# Patient Record
Sex: FEMALE | Race: WHITE | Employment: FULL TIME | URBAN - METROPOLITAN AREA
[De-identification: names, ages, dates, MRNs, and addresses within clinical notes are randomized per-mention and may not be internally consistent; named-entity substitution may affect disease eponyms.]

---

## 2017-01-03 PROBLEM — J30.9 ALLERGIC RHINITIS: Status: ACTIVE | Noted: 2017-01-03

## 2017-02-01 ENCOUNTER — HOSPITAL ENCOUNTER (OUTPATIENT)
Dept: PHYSICAL THERAPY | Age: 42
Discharge: HOME | End: 2017-02-01
Attending: INTERNAL MEDICINE
Payer: COMMERCIAL

## 2017-02-01 DIAGNOSIS — M54.2 NECK PAIN: ICD-10-CM

## 2017-02-01 PROCEDURE — 97161 PT EVAL LOW COMPLEX 20 MIN: CPT

## 2017-02-01 PROCEDURE — 97110 THERAPEUTIC EXERCISES: CPT

## 2017-02-02 NOTE — PROGRESS NOTES
Ambulatory/Rehab Services H2 Model Falls Risk Assessment    Risk Factor Pts. ·   Confusion/Disorientation/Impulsivity  []    4 ·   Symptomatic Depression  []   2 ·   Altered Elimination  []   1 ·   Dizziness/Vertigo  []   1 ·   Gender (Male)  []   1 ·   Any administered antiepileptics (anticonvulsants):  []   2 ·   Any administered benzodiazepines:  []   1 ·   Visual Impairment (specify):  []   1 ·   Portable Oxygen Use  []   1 ·   Orthostatic ? BP  []   1 ·   History of Recent Falls (within 3 mos.)  []   5     Ability to Rise from Chair (choose one) Pts. ·   Ability to rise in a single movement  [x]   0 ·   Pushes up, successful in one attempt  []   1 ·   Multiple attempts, but successful  []   3 ·   Unable to rise without assistance  []   4   Total: (5 or greater = High Risk) 0     Falls Prevention Plan:   []                Physical Limitations to Exercise (specify):   []                Mobility Assistance Device (type):   []                Exercise/Equipment Adaptation (specify):    ©2010 [facility] [address] Patent #[id number].  Federal Law prohibits the replication, distribution or use without written permission from [facility] [employer]

## 2017-02-02 NOTE — PROGRESS NOTES
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 Fall Risk Score: 0 (? 5 = High Risk)  MD Orders: Evaluate and Treat MEDICAL/REFERRING DIAGNOSIS:  Neck pain [M54.2]   DATE OF ONSET: 2016  REFERRING PHYSICIAN: Adina Block MD  RETURN PHYSICIAN APPOINTMENT: TBD     INITIAL ASSESSMENT:  Ms. Loren Hill presents neck pain which began in 2016. Her chief complaint is pain and stiffness and describes a near constant sensation of choking around her lower cervical spine. She also presents with decreased cervical mobility, flexibility and strength which limits her ability to perform ADL and work related tasks without pain. She will benefit from skilled therapy to improve her strength and mobility to return to prior level of function. PROBLEM LIST (Impacting functional limitations):  1. Decreased Strength  2. Decreased ADL/Functional Activities  3. Increased Pain  4. Decreased Activity Tolerance  5. Decreased Flexibility/Joint Mobility  6. Decreased [place] with Home Exercise Program INTERVENTIONS PLANNED:  1. Heat  2. Home Exercise Program (HEP)  3. Manual Therapy  4. Neuromuscular Re-education/Strengthening  5. Range of Motion (ROM)  6. Therapeutic Exercise/Strengthening  7. Transcutaneous Electrical Nerve Stimulation (TENS)   TREATMENT PLAN:  Effective Dates: 17 TO 3/29/17. Frequency/Duration: 2 times a week for 8 weeks  GOALS: (Goals have been discussed and agreed upon with patient.)  Discharge Goals: Time Frame: 8  1.  Patient will be independent with home exercise program without assistance from therapist. 2. Patient will display pain free cervical rotation bilaterally to drive without pain/difficulty. 3. Patient will report pain rated at less than 2/10 at worst to return to full ADL/work performance. 4. Patient will report minimal severity of choking sensation around her neck to return to ADL tasks with less discomfort. 5. Patient will report NDI score of 0/50 to demonstrate improved cervical mobility and function. Rehabilitation Potential For Stated Goals: Good  Regarding Sridevi Fowler's therapy, I certify that the treatment plan above will be carried out by a therapist or under their direction. Thank you for this referral,  Nadia Henderson Siraniya     Referring Physician Signature: Julio C Dyson MD              Date                    The information in this section was collected on 17 (except where otherwise noted). HISTORY:   History of Present Injury/Illness (Reason for Referral):  Remigio Miller presents with neck pain which began in 2016 after noticing small mass at her anterior neck. She reports near constant choking sensation around her neck which also limits her neck mobility. She reports having had ultrasound and CT scans without insignificant findings and is scheduled to follow up with allergist and neurologist for further workup to help determine possible cause of her current symptoms. Along with pain and tightness she also reports brief instance of the back of her tongue going numb but sensation quickly normalized and has not returned in the past few weeks. Her goal is to treat the muscular components to her pain and return to normal daily tasks without pain. Past Medical History/Comorbidities:   Ms. Yina Smith  has a past medical history of Allergic rhinitis (1/3/2017) and H/O seasonal allergies. Ms. Yina Smith  has a past surgical history that includes  section. Social History/Living Environment:     Lives in private setting home, no barriers to progress.    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 , Disp: 90 Tab, Rfl: 3    cyclobenzaprine (FLEXERIL) 5 mg tablet, TAKE ONE TABLET BY MOUTH THREE TIMES A DAY as needed, Disp: 90 Tab, Rfl: 3    ibuprofen (MOTRIN) 200 mg tablet, Take  by mouth., Disp: , Rfl:     raNITIdine (ZANTAC) 150 mg tablet, Take 1 Tab by mouth two (2) times a day., Disp: 180 Tab, Rfl: 1    triamcinolone (NASACORT AQ) 55 mcg nasal inhaler, 2 Sprays by Both Nostrils route daily. , Disp: 1 Bottle, Rfl: 5    loratadine (CLARITIN) 10 mg tablet, Take 1 Tab by mouth daily. , Disp: 90 Tab, Rfl: 1    clonazePAM (KLONOPIN) 0.5 mg tablet, Take 1 Tab by mouth three (3) times daily as needed. Max Daily Amount: 1.5 mg., Disp: 20 Tab, Rfl: 0   Date Last Reviewed:  2/1/2017     Number of Personal Factors/Comorbidities that affect the Plan of Care: 0: LOW COMPLEXITY   EXAMINATION:   Observation/Orthostatic Postural Assessment:  Remigio Miller presents with moderate rounded shoulders and forward head posture. Significant kyphosis noted at cervicothoracic junction. Palpation:          Patient is tender at suboccipitals and all anterior neck musculature, especially bilateral SCM.    ROM:   Date: 2/1/17   Flexion FP   Extension DP   SBL DP   SBR DP   Rot L DP   Rot R  DP   Abbreviations: FP- functional painful, DP- dysfunctional non painful, FN- functional non painful, FP- functional painful   Strength:          5/5 gross upper extrermity strength   Neurological Screen:        Dermatomes:  Normal        Reflexes:  Normal        Neural Tension Tests:  Normal        Sensation: Normal           (SB=sidebending, Rot=rotation, TTP=tender to palpation, ULTTA=upper limb tension test-A, UQS=upper quarter scan, WNL=within normal limits; ROM measured in degrees)           Body Structures Involved:  1. Joints  2. Muscles  3. Ligaments Body Functions Affected:  1. Sensory/Pain  2. Neuromusculoskeletal  3. Movement Related Activities and Participation Affected:  1. General Tasks and Demands  2. Mobility  3. Self Care  4. Community, Social and [place]   Number of elements (examined above) that affect the Plan of Care: 4+: HIGH COMPLEXITY   CLINICAL PRESENTATION:   Presentation: Evolving clinical presentation with changing clinical characteristics: MODERATE COMPLEXITY   CLINICAL DECISION MAKING:   Outcome Measure: Tool Used: Neck Disability Index (NDI)  Score:  Initial: 6/50 (2/1/17)  Most Recent: X/50 (Date: -- )   Interpretation of Score: The Neck Disability Index is a revised form of the Oswestry Low Back Pain Index and is designed to measure the activities of daily living in person's with neck pain. Each section is scored on a 0-5 scale, 5 representing the greatest disability. The scores of each section are added together for a total score of 50. Medical Necessity:   · Patient is expected to demonstrate progress in strength and range of motion to increase independence with ADL and work tasks. Reason for Services/Other Comments:  · Patient continues to require present interventions due to patient's inability to rotate, look up or down without pain. Use of outcome tool(s) and clinical judgement create a POC that gives a: Clear prediction of patient's progress: LOW COMPLEXITY            TREATMENT:   (In addition to Assessment/Re-Assessment sessions the following treatments were rendered)  Pre-treatment Symptoms/Complaints:  Patient says she is frustrated in that she still is unsure what is causing her current symptoms. Pain: Initial:   Pain Intensity 1: 2/10 Post Session:  2/10     THERAPEUTIC EXERCISE: (15 minutes):  Exercises per grid below to improve mobility and strength. Required moderate verbal and manual cues to promote proper body posture and promote proper body mechanics. Progressed resistance, range, repetitions and complexity of movement as indicated. Date:  2/1/17 Date:   Date:     Activity/Exercise Parameters Parameters Parameters   Patient Education Plan of care, HEP     Diaphragmatic breathing 5 min     Cervical AROM All planes, pain free range x 5     Posture review 5 min, scapular retraction x 10                             Treatment/Session Assessment:    · Response to Treatment:  Patient tolerates well inclusion of initial cervical ROM and postural awareness tasks as part of HEP today. · Compliance with Program/Exercises: compliant most of the time. · Recommendations/Intent for next treatment session: \"Next visit will focus on advancements to more challenging activities\". Total Treatment Duration: 45 minutes evaluation, 15 minutes treatment   PT Patient Time In/Time Out  Time In: 1530  Time Out: [address].  Dereck

## 2017-02-07 ENCOUNTER — HOSPITAL ENCOUNTER (OUTPATIENT)
Dept: PHYSICAL THERAPY | Age: 42
Discharge: HOME | End: 2017-02-07
Attending: INTERNAL MEDICINE
Payer: COMMERCIAL

## 2017-02-07 PROCEDURE — 97140 MANUAL THERAPY 1/> REGIONS: CPT

## 2017-02-07 PROCEDURE — 97110 THERAPEUTIC EXERCISES: CPT

## 2017-02-07 NOTE — PROGRESS NOTES
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 Fall Risk Score: 0 (? 5 = High Risk)  MD Orders: Evaluate and Treat MEDICAL/REFERRING DIAGNOSIS:  Persons encountering health services in other specified circumstances [Z76.89]   DATE OF ONSET: 2016  REFERRING PHYSICIAN: Jenny Solitario MD  RETURN PHYSICIAN APPOINTMENT: TBD     INITIAL ASSESSMENT:  Ms. Leopold Greenhouse presents neck pain which began in 2016. Her chief complaint is pain and stiffness and describes a near constant sensation of choking around her lower cervical spine. She also presents with decreased cervical mobility, flexibility and strength which limits her ability to perform ADL and work related tasks without pain. She will benefit from skilled therapy to improve her strength and mobility to return to prior level of function. PROBLEM LIST (Impacting functional limitations):  1. Decreased Strength  2. Decreased ADL/Functional Activities  3. Increased Pain  4. Decreased Activity Tolerance  5. Decreased Flexibility/Joint Mobility  6. Decreased [place] with Home Exercise Program INTERVENTIONS PLANNED:  1. Heat  2. Home Exercise Program (HEP)  3. Manual Therapy  4. Neuromuscular Re-education/Strengthening  5. Range of Motion (ROM)  6. Therapeutic Exercise/Strengthening  7. Transcutaneous Electrical Nerve Stimulation (TENS)   TREATMENT PLAN:  Effective Dates: 17 TO 3/29/17. Frequency/Duration: 2 times a week for 8 weeks  GOALS: (Goals have been discussed and agreed upon with patient.)  Discharge Goals: Time Frame: 8  1.  Patient will be independent with home exercise program without assistance from therapist.   2. Patient will display pain free cervical rotation bilaterally to drive without pain/difficulty. 3. Patient will report pain rated at less than 2/10 at worst to return to full ADL/work performance. 4. Patient will report minimal severity of choking sensation around her neck to return to ADL tasks with less discomfort. 5. Patient will report NDI score of 0/50 to demonstrate improved cervical mobility and function. Rehabilitation Potential For Stated Goals: Good              The information in this section was collected on 17 (except where otherwise noted). HISTORY:   History of Present Injury/Illness (Reason for Referral):  Rakel Rdz presents with neck pain which began in 2016 after noticing small mass at her anterior neck. She reports near constant choking sensation around her neck which also limits her neck mobility. She reports having had ultrasound and CT scans without insignificant findings and is scheduled to follow up with allergist and neurologist for further workup to help determine possible cause of her current symptoms. Along with pain and tightness she also reports brief instance of the back of her tongue going numb but sensation quickly normalized and has not returned in the past few weeks. Her goal is to treat the muscular components to her pain and return to normal daily tasks without pain. Past Medical History/Comorbidities:   Ms. Constance Morales  has a past medical history of Allergic rhinitis (1/3/2017) and H/O seasonal allergies. Ms. Constance Morales  has a past surgical history that includes  section. Social History/Living Environment:     Lives in private setting home, no barriers to progress. 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 , Disp: 90 Tab, Rfl: 3    cyclobenzaprine (FLEXERIL) 5 mg tablet, TAKE ONE TABLET BY MOUTH THREE TIMES A DAY as needed, Disp: 90 Tab, Rfl: 3    ibuprofen (MOTRIN) 200 mg tablet, Take  by mouth., Disp: , Rfl:     raNITIdine (ZANTAC) 150 mg tablet, Take 1 Tab by mouth two (2) times a day., Disp: 180 Tab, Rfl: 1    triamcinolone (NASACORT AQ) 55 mcg nasal inhaler, 2 Sprays by Both Nostrils route daily. , Disp: 1 Bottle, Rfl: 5    loratadine (CLARITIN) 10 mg tablet, Take 1 Tab by mouth daily. , Disp: 90 Tab, Rfl: 1    clonazePAM (KLONOPIN) 0.5 mg tablet, Take 1 Tab by mouth three (3) times daily as needed. Max Daily Amount: 1.5 mg., Disp: 20 Tab, Rfl: 0   Date Last Reviewed:  2/7/2017     Number of Personal Factors/Comorbidities that affect the Plan of Care: 0: LOW COMPLEXITY   EXAMINATION:   Observation/Orthostatic Postural Assessment:  Loletta List presents with moderate rounded shoulders and forward head posture. Significant kyphosis noted at cervicothoracic junction. Palpation:          Patient is tender at suboccipitals and all anterior neck musculature, especially bilateral SCM. ROM:   Date: 2/1/17   Flexion FP   Extension DP   SBL DP   SBR DP   Rot L DP   Rot R  DP   Abbreviations: FP- functional painful, DP- dysfunctional non painful, FN- functional non painful, FP- functional painful   Strength:          5/5 gross upper extrermity strength   Neurological Screen:        Dermatomes:  Normal        Reflexes:  Normal        Neural Tension Tests:  Normal        Sensation: Normal           (SB=sidebending, Rot=rotation, TTP=tender to palpation, ULTTA=upper limb tension test-A, UQS=upper quarter scan, WNL=within normal limits; ROM measured in degrees)           Body Structures Involved:  1. Joints  2. Muscles  3. Ligaments Body Functions Affected:  1. Sensory/Pain  2. Neuromusculoskeletal  3. Movement Related Activities and Participation Affected:  1.  General Tasks and Demands  2. Mobility  3. Self Care  4. Community, Social and [place]   Number of elements (examined above) that affect the Plan of Care: 4+: HIGH COMPLEXITY   CLINICAL PRESENTATION:   Presentation: Evolving clinical presentation with changing clinical characteristics: MODERATE COMPLEXITY   CLINICAL DECISION MAKING:   Outcome Measure: Tool Used: Neck Disability Index (NDI)  Score:  Initial: 6/50 (2/1/17)  Most Recent: X/50 (Date: -- )   Interpretation of Score: The Neck Disability Index is a revised form of the Oswestry Low Back Pain Index and is designed to measure the activities of daily living in person's with neck pain. Each section is scored on a 0-5 scale, 5 representing the greatest disability. The scores of each section are added together for a total score of 50. Medical Necessity:   · Patient is expected to demonstrate progress in strength and range of motion to increase independence with ADL and work tasks. Reason for Services/Other Comments:  · Patient continues to require present interventions due to patient's inability to rotate, look up or down without pain. Use of outcome tool(s) and clinical judgement create a POC that gives a: Clear prediction of patient's progress: LOW COMPLEXITY            TREATMENT:   (In addition to Assessment/Re-Assessment sessions the following treatments were rendered)  Pre-treatment Symptoms/Complaints:  Patient reports her feelings of tightness remain unchanged the past few days. Pain: Initial:   Pain Intensity 1: 5/10 Post Session:  2/10     Therapeutic Exercise: (25 Minutes):  Exercises per grid below to improve mobility and strength. Required minimal verbal, manual and tactile cues to promote proper body posture and promote proper body mechanics. Progressed resistance, range, repetitions and complexity of movement as indicated.      Date:  2/1/17 Date:  2/7/17 Date:     Activity/Exercise Parameters Parameters Parameters   Patient Education Plan of care, HEP Update HEP    Diaphragmatic breathing 5 min 5 min    Cervical AROM All planes, pain free range x 5 x10 supine, x20 on ball    Posture review 5 min, scapular retraction x 10 5 min    Upper trap stretch -- 30s x 2    Scalene stretch -- 30s x 2            Manual Therapy (    Soft Tissue Mobilization Duration  Duration: 30 Minutes): Manual techniques to facilitate improved motion and decreased pain. - Suboccipital release, manual cervical traction  - Manual upper trapezius stretching    Treatment/Session Assessment:    · Response to Treatment:  Patient has more tightness at right scalene/upper trap musculature versus left but reports slightly decreased muscular tightness at end of today's session. Patient reports she is scheduled for follow up with ENT due to possibility of link of symptoms with recurrent reflux/hearburn symptoms. · Compliance with Program/Exercises: compliant most of the time. · Recommendations/Intent for next treatment session: \"Next visit will focus on advancements to more challenging activities\". Total Treatment Duration: 55 minutes  PT Patient Time In/Time Out  Time In: 1630  Time Out: [place].  Dereck

## 2017-02-08 PROBLEM — R13.12 OROPHARYNGEAL DYSPHAGIA: Status: ACTIVE | Noted: 2017-02-08

## 2017-02-09 ENCOUNTER — HOSPITAL ENCOUNTER (OUTPATIENT)
Dept: PHYSICAL THERAPY | Age: 42
Discharge: HOME | End: 2017-02-09
Attending: INTERNAL MEDICINE
Payer: COMMERCIAL

## 2017-02-09 PROCEDURE — 97140 MANUAL THERAPY 1/> REGIONS: CPT

## 2017-02-09 PROCEDURE — 97110 THERAPEUTIC EXERCISES: CPT

## 2017-02-09 NOTE — PROGRESS NOTES
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 Fall Risk Score: 0 (? 5 = High Risk)  MD Orders: Evaluate and Treat MEDICAL/REFERRING DIAGNOSIS:  Persons encountering health services in other specified circumstances [Z76.89]   DATE OF ONSET: 2016  REFERRING PHYSICIAN: Blaise Reyes MD  RETURN PHYSICIAN APPOINTMENT: TBD     INITIAL ASSESSMENT:  Ms. Jayna Conn presents neck pain which began in 2016. Her chief complaint is pain and stiffness and describes a near constant sensation of choking around her lower cervical spine. She also presents with decreased cervical mobility, flexibility and strength which limits her ability to perform ADL and work related tasks without pain. She will benefit from skilled therapy to improve her strength and mobility to return to prior level of function. PROBLEM LIST (Impacting functional limitations):  1. Decreased Strength  2. Decreased ADL/Functional Activities  3. Increased Pain  4. Decreased Activity Tolerance  5. Decreased Flexibility/Joint Mobility  6. Decreased P[place] with Home Exercise Program INTERVENTIONS PLANNED:  1. Heat  2. Home Exercise Program (HEP)  3. Manual Therapy  4. Neuromuscular Re-education/Strengthening  5. Range of Motion (ROM)  6. Therapeutic Exercise/Strengthening  7. Transcutaneous Electrical Nerve Stimulation (TENS)   TREATMENT PLAN:  Effective Dates: 17 TO 3/29/17. Frequency/Duration: 2 times a week for 8 weeks  GOALS: (Goals have been discussed and agreed upon with patient.)  Discharge Goals: Time Frame: 8  1.  Patient will be independent with home exercise program without assistance from therapist.   2. Patient will display pain free cervical rotation bilaterally to drive without pain/difficulty. 3. Patient will report pain rated at less than 2/10 at worst to return to full ADL/work performance. 4. Patient will report minimal severity of choking sensation around her neck to return to ADL tasks with less discomfort. 5. Patient will report NDI score of 0/50 to demonstrate improved cervical mobility and function. Rehabilitation Potential For Stated Goals: Good              The information in this section was collected on 17 (except where otherwise noted). HISTORY:   History of Present Injury/Illness (Reason for Referral):  Merlinda Locks presents with neck pain which began in 2016 after noticing small mass at her anterior neck. She reports near constant choking sensation around her neck which also limits her neck mobility. She reports having had ultrasound and CT scans without insignificant findings and is scheduled to follow up with allergist and neurologist for further workup to help determine possible cause of her current symptoms. Along with pain and tightness she also reports brief instance of the back of her tongue going numb but sensation quickly normalized and has not returned in the past few weeks. Her goal is to treat the muscular components to her pain and return to normal daily tasks without pain. Past Medical History/Comorbidities:   Ms. Gwen Mitchell  has a past medical history of Allergic rhinitis (1/3/2017) and H/O seasonal allergies. Ms. Gwen Mitchell  has a past surgical history that includes  section. Social History/Living Environment:     Lives in private setting home, no barriers to progress. 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    Dominant Side:         RIGHT  Current Medications:       Current Outpatient Prescriptions:     montelukast (SINGULAIR) 10 mg tablet, Take 1 Tab by mouth daily., Disp: 90 Tab, Rfl: 3    cyclobenzaprine (FLEXERIL) 5 mg tablet, TAKE ONE TABLET BY MOUTH THREE TIMES A DAY as needed, Disp: 90 Tab, Rfl: 3    ibuprofen (MOTRIN) 200 mg tablet, Take  by mouth., Disp: , Rfl:     raNITIdine (ZANTAC) 150 mg tablet, Take 1 Tab by mouth two (2) times a day., Disp: 180 Tab, Rfl: 1    triamcinolone (NASACORT AQ) 55 mcg nasal inhaler, 2 Sprays by Both Nostrils route daily. , Disp: 1 Bottle, Rfl: 5    loratadine (CLARITIN) 10 mg tablet, Take 1 Tab by mouth daily. , Disp: 90 Tab, Rfl: 1    clonazePAM (KLONOPIN) 0.5 mg tablet, Take 1 Tab by mouth three (3) times daily as needed. Max Daily Amount: 1.5 mg., Disp: 20 Tab, Rfl: 0   Date Last Reviewed:  2/9/2017     Number of Personal Factors/Comorbidities that affect the Plan of Care: 0: LOW COMPLEXITY   EXAMINATION:   Observation/Orthostatic Postural Assessment:  Dorinda Padilla presents with moderate rounded shoulders and forward head posture. Significant kyphosis noted at cervicothoracic junction. Palpation:          Patient is tender at suboccipitals and all anterior neck musculature, especially bilateral SCM. ROM:   Date: 2/1/17   Flexion FP   Extension DP   SBL DP   SBR DP   Rot L DP   Rot R  DP   Abbreviations: FP- functional painful, DP- dysfunctional non painful, FN- functional non painful, FP- functional painful   Strength:          5/5 gross upper extrermity strength   Neurological Screen:        Dermatomes:  Normal        Reflexes:  Normal        Neural Tension Tests:  Normal        Sensation: Normal           (SB=sidebending, Rot=rotation, TTP=tender to palpation, ULTTA=upper limb tension test-A, UQS=upper quarter scan, WNL=within normal limits; ROM measured in degrees)           Body Structures Involved:  1. Joints  2. Muscles  3. Ligaments Body Functions Affected:  1. Sensory/Pain  2. Neuromusculoskeletal  3. Movement Related Activities and Participation Affected:  1. General Tasks and Demands  2. Mobility  3.  Self Care  4. Community, Social and [place]   Number of elements (examined above) that affect the Plan of Care: 4+: HIGH COMPLEXITY   CLINICAL PRESENTATION:   Presentation: Evolving clinical presentation with changing clinical characteristics: MODERATE COMPLEXITY   CLINICAL DECISION MAKING:   Outcome Measure: Tool Used: Neck Disability Index (NDI)  Score:  Initial: 6/50 (2/1/17)  Most Recent: X/50 (Date: -- )   Interpretation of Score: The Neck Disability Index is a revised form of the Oswestry Low Back Pain Index and is designed to measure the activities of daily living in person's with neck pain. Each section is scored on a 0-5 scale, 5 representing the greatest disability. The scores of each section are added together for a total score of 50. Medical Necessity:   · Patient is expected to demonstrate progress in strength and range of motion to increase independence with ADL and work tasks. Reason for Services/Other Comments:  · Patient continues to require present interventions due to patient's inability to rotate, look up or down without pain. Use of outcome tool(s) and clinical judgement create a POC that gives a: Clear prediction of patient's progress: LOW COMPLEXITY            TREATMENT:   (In addition to Assessment/Re-Assessment sessions the following treatments were rendered)  Pre-treatment Symptoms/Complaints:  Patient says she saw neurologist yesterday and physician determined there was no evidence of neurological involvement to her symptoms. She states her discomfort has been better the past few days. Pain: Initial:   Pain Intensity 1: 3/10 Post Session:  2/10     Therapeutic Exercise: (15 Minutes):  Exercises per grid below to improve mobility and strength. Required minimal verbal, manual and tactile cues to promote proper body posture and promote proper body mechanics. Progressed resistance, range, repetitions and complexity of movement as indicated.      Date:  2/1/17 Date:  2/7/17 Date:  2/9/17   Activity/Exercise Parameters Parameters Parameters   Patient Education Plan of care, HEP Update HEP Review HEP   Diaphragmatic breathing 5 min 5 min 5 min   Cervical AROM All planes, pain free range x 5 x10 supine, x20 on ball x20 on ball   Posture review 5 min, scapular retraction x 10 5 min --   Upper trap stretch -- 30s x 2 30s x 2   Scalene stretch -- 30s x 2 30s x 2   PNF with cervical rotation -- -- x5 each side     Manual Therapy (    Soft Tissue Mobilization Duration  Duration: 30 Minutes): Manual techniques to facilitate improved motion and decreased pain. - Suboccipital release, manual cervical traction  - Manual upper trapezius stretching    Treatment/Session Assessment:    · Response to Treatment:  Patient much less irritable today and displays increased pain free cervical mobility today. She will follow up with ENT on 2/21/17 and then return to therapy as needed after visit. · Compliance with Program/Exercises: compliant most of the time. · Recommendations/Intent for next treatment session: \"Next visit will focus on advancements to more challenging activities\". Total Treatment Duration: 45 minutes  PT Patient Time In/Time Out  Time In: 1630  Time Out: Sadi 58.  Dereck

## 2017-02-15 ENCOUNTER — APPOINTMENT (OUTPATIENT)
Dept: PHYSICAL THERAPY | Age: 42
End: 2017-02-15
Attending: INTERNAL MEDICINE
Payer: COMMERCIAL

## 2017-03-08 ENCOUNTER — HOSPITAL ENCOUNTER (OUTPATIENT)
Dept: PHYSICAL THERAPY | Age: 42
Discharge: HOME | End: 2017-03-08
Attending: INTERNAL MEDICINE

## 2017-03-08 NOTE — PROGRESS NOTES
[facility] at [address] [place] [address] [facility]  [phone]   [phone]    DATE: 3/8/2017    Patient cancelled appointment today and will place therapy on hold at this time to follow up with swallowing study later this month.        Karlene Beverly, PT, DPT

## 2017-03-14 ENCOUNTER — HOSPITAL ENCOUNTER (OUTPATIENT)
Dept: PHYSICAL THERAPY | Age: 42
End: 2017-03-14
Attending: INTERNAL MEDICINE

## 2017-03-16 ENCOUNTER — APPOINTMENT (OUTPATIENT)
Dept: PHYSICAL THERAPY | Age: 42
End: 2017-03-16
Attending: INTERNAL MEDICINE

## 2017-03-17 ENCOUNTER — HOSPITAL ENCOUNTER (OUTPATIENT)
Dept: MRI IMAGING | Age: 42
Discharge: HOME | End: 2017-03-17
Attending: INTERNAL MEDICINE
Payer: COMMERCIAL

## 2017-03-17 DIAGNOSIS — R51.9 NONINTRACTABLE HEADACHE, UNSPECIFIED CHRONICITY PATTERN, UNSPECIFIED HEADACHE TYPE: ICD-10-CM

## 2017-03-17 DIAGNOSIS — M54.2 NECK PAIN: ICD-10-CM

## 2017-03-17 PROCEDURE — 70551 MRI BRAIN STEM W/O DYE: CPT

## 2017-03-17 PROCEDURE — 72141 MRI NECK SPINE W/O DYE: CPT

## 2017-04-05 ENCOUNTER — APPOINTMENT (OUTPATIENT)
Dept: PHYSICAL THERAPY | Age: 42
End: 2017-04-05
Attending: INTERNAL MEDICINE

## 2017-04-25 ENCOUNTER — HOSPITAL ENCOUNTER (OUTPATIENT)
Age: 42
Setting detail: OUTPATIENT SURGERY
Discharge: HOME | End: 2017-04-25
Attending: ANESTHESIOLOGY | Admitting: ANESTHESIOLOGY
Payer: COMMERCIAL

## 2017-04-25 VITALS
SYSTOLIC BLOOD PRESSURE: 125 MMHG | BODY MASS INDEX: 37.5 KG/M2 | TEMPERATURE: 98.5 F | DIASTOLIC BLOOD PRESSURE: 82 MMHG | WEIGHT: 192 LBS | HEART RATE: 90 BPM | OXYGEN SATURATION: 96 % | RESPIRATION RATE: 12 BRPM

## 2017-04-25 LAB — HCG UR QL: NEGATIVE

## 2017-04-25 PROCEDURE — 76210000020 HC REC RM PH II FIRST 0.5 HR: Performed by: ANESTHESIOLOGY

## 2017-04-25 PROCEDURE — 77030014125 HC TY EPDRL BBMI -B: Performed by: ANESTHESIOLOGY

## 2017-04-25 PROCEDURE — 76210000063 HC OR PH I REC FIRST 0.5 HR: Performed by: ANESTHESIOLOGY

## 2017-04-25 PROCEDURE — 74011250636 HC RX REV CODE- 250/636: Performed by: ANESTHESIOLOGY

## 2017-04-25 PROCEDURE — 74011250636 HC RX REV CODE- 250/636

## 2017-04-25 PROCEDURE — 76010000230: Performed by: ANESTHESIOLOGY

## 2017-04-25 PROCEDURE — 81025 URINE PREGNANCY TEST: CPT

## 2017-04-25 RX ORDER — FENTANYL CITRATE 50 UG/ML
INJECTION, SOLUTION INTRAMUSCULAR; INTRAVENOUS AS NEEDED
Status: DISCONTINUED | OUTPATIENT
Start: 2017-04-25 | End: 2017-04-25 | Stop reason: HOSPADM

## 2017-04-25 RX ORDER — MIDAZOLAM HYDROCHLORIDE 1 MG/ML
INJECTION, SOLUTION INTRAMUSCULAR; INTRAVENOUS AS NEEDED
Status: DISCONTINUED | OUTPATIENT
Start: 2017-04-25 | End: 2017-04-25 | Stop reason: HOSPADM

## 2017-04-25 RX ORDER — TRIAMCINOLONE ACETONIDE 40 MG/ML
INJECTION, SUSPENSION INTRA-ARTICULAR; INTRAMUSCULAR AS NEEDED
Status: DISCONTINUED | OUTPATIENT
Start: 2017-04-25 | End: 2017-04-25 | Stop reason: HOSPADM

## 2017-04-25 NOTE — IP AVS SNAPSHOT
[address] [facility] 
[phone] Patient: Andre Hudson MRN: [id number] :1975 You are allergic to the following No active allergies Recent Documentation Weight BMI OB Status Smoking Status 87.1 kg 37.5 kg/m2 Having regular periods Never Smoker Emergency Contacts Name Discharge Info Relation Home Work Mobile Trevon Hill DISCHARGE CAREGIVER [3] Spouse [3] [phone] About your hospitalization You were admitted on:  2017 You last received care in the:  [facility] OP PACU You were discharged on:  2017 Unit phone number:  [phone] Why you were hospitalized Your primary diagnosis was:  Not on File Providers Seen During Your Hospitalizations Provider Role Specialty Primary office phone Tammy Ruiz MD Attending Provider Anesthesiology [phone] Your Primary Care Physician (PCP) Primary Care Physician Office Phone Office Fax Nimco Weinberg [phone] Follow-up Information None Current Discharge Medication List  
  
ASK your doctor about these medications Dose & Instructions Dispensing Information Comments Morning Noon Evening Bedtime  
 clonazePAM 0.5 mg tablet Commonly known as:  Elaine Medellin Your last dose was: Your next dose is:    
   
   
 Dose:  0.5 mg Take 1 Tab by mouth three (3) times daily as needed. Max Daily Amount: 1.5 mg.  
 Quantity:  20 Tab Refills:  0  
     
   
   
   
  
 cyclobenzaprine 5 mg tablet Commonly known as:  FLEXERIL Your last dose was: Your next dose is: TAKE ONE TABLET BY MOUTH THREE TIMES A DAY as needed Quantity:  90 Tab Refills:  3  
     
   
   
   
  
 loratadine 10 mg tablet Commonly known as:  Lendon Chantal Your last dose was: Your next dose is:    
   
   
 Dose:  10 mg Take 1 Tab by mouth daily. Quantity:  90 Tab Refills:  1  
     
   
   
   
  
 montelukast 10 mg tablet Commonly known as:  SINGULAIR Your last dose was: Your next dose is:    
   
   
 Dose:  10 mg Take 1 Tab by mouth daily. Quantity:  90 Tab Refills:  3 Omeprazole delayed release 20 mg tablet Commonly known as:  PRILOSEC D/R Your last dose was: Your next dose is:    
   
   
 Dose:  20 mg Take 20 mg by mouth daily. Refills:  0  
     
   
   
   
  
 triamcinolone 55 mcg nasal inhaler Commonly known as:  NASACORT AQ Your last dose was: Your next dose is:    
   
   
 Dose:  2 Spray 2 Sprays by Both Nostrils route daily. Quantity:  1 Bottle Refills:  5 Discharge Instructions Pain Management Aftercare Common Side Effects that may last 8-12 hours: 
Drowsiness  Slurred speech  Dizziness Poor balance  Blurred vision Hangover effect (headache, upset stomach, etc.) Aftercare Instructions: You must have a responsible adult drive you home. Do not drive a car or operate equipment for at least 12 hours. Do not take any new medications for at least 24 hours unless your doctor has prescribed them and he is aware that you are taking them. Do not drink any alcoholic beverages until the next day. Advance to your normal diet as tolerated. Expect soreness at the injection site that will improve over the next 24 hours. Avoid strenuous exercise or heavy lifting. Pre-injection activities may be resumed tomorrow (unless instructed otherwise by your doctor). Notify your doctor immediately if any of the following symptoms occur: 
Severe pain at the injection site Bleeding or drainage from injection site Fever 101 degrees F or greater New or increased weakness/numbness of extremities that does not resolve Severe headache that disappears or gets better when you lie down Breathing difficulty Skin rash Vomiting Seizures Unusual drowsiness Doctor's Phone Number: [phone] Follow-up Care: Follow up appointment: 5/19/17 at 10:00AM 
 
 
DISCHARGE SUMMARY from Nurse PATIENT INSTRUCTIONS: 
 
After general anesthesia or intravenous sedation, for 24 hours or while taking prescription Narcotics: · Limit your activities · Do not drive and operate hazardous machinery · Do not make important personal or business decisions · Do  not drink alcoholic beverages · If you have not urinated within 8 hours after discharge, please contact your surgeon on call. *  Please give a list of your current medications to your Primary Care Provider. *  Please update this list whenever your medications are discontinued, doses are 
    changed, or new medications (including over-the-counter products) are added. *  Please carry medication information at all times in case of emergency situations. These are general instructions for a healthy lifestyle: No smoking/ No tobacco products/ Avoid exposure to second hand smoke Surgeon General's Warning:  Quitting smoking now greatly reduces serious risk to your health. Obesity, smoking, and sedentary lifestyle greatly increases your risk for illness A healthy diet, regular physical exercise & weight monitoring are important for maintaining a healthy lifestyle You may be retaining fluid if you have a history of heart failure or if you experience any of the following symptoms:  Weight gain of 3 pounds or more overnight or 5 pounds in a week, increased swelling in our hands or feet or shortness of breath while lying flat in bed. Please call your doctor as soon as you notice any of these symptoms; do not wait until your next office visit. Recognize signs and symptoms of STROKE: 
 
F-face looks uneven A-arms unable to move or move unevenly S-speech slurred or non-existent T-time-call 911 as soon as signs and symptoms begin-DO NOT go Back to bed or wait to see if you get better-TIME IS BRAIN. Discharge Orders None Introducing [facility] HE[facility]! Dear Cam Martinez: 
Thank you for requesting a [employer] account. Our records indicate that you already have an active [employer] account. You can access your account anytime at [email or web address] Did you know that you can access your hospital and ER discharge instructions at any time in [employer]? You can also review all of your test results from your hospital stay or ER visit. Additional Information If you have questions, please visit the Frequently Asked Questions section of the [employer] website at [email or web address] Remember, [employer] is NOT to be used for urgent needs. For medical emergencies, dial 911. Now available from your iPhone and Android! General Information Please provide this summary of care documentation to your next provider. Patient Signature:  ____________________________________________________________ Date:  ____________________________________________________________  
  
Prabha Cons Provider Signature:  ____________________________________________________________ Date:  ____________________________________________________________

## 2017-04-25 NOTE — H&P
Date of Surgery Update:  Jayne Morse was seen and examined. History and physical has been reviewed. The patient has been examined.  There have been no significant clinical changes since the completion of the originally dated History and Physical.    Signed By: Shari Brewster MD     April 25, 2017 7:34 AM

## 2017-04-25 NOTE — DISCHARGE INSTRUCTIONS
Pain Management Aftercare    Common Side Effects that may last 8-12 hours:  Drowsiness  Slurred speech  Dizziness  Poor balance  Blurred vision     Hangover effect (headache, upset stomach, etc.)    Aftercare Instructions: You must have a responsible adult drive you home. Do not drive a car or operate equipment for at least 12 hours. Do not take any new medications for at least 24 hours unless your doctor has prescribed them and he is aware that you are taking them. Do not drink any alcoholic beverages until the next day. Advance to your normal diet as tolerated. Expect soreness at the injection site that will improve over the next 24 hours. Avoid strenuous exercise or heavy lifting. Pre-injection activities may be resumed tomorrow (unless instructed otherwise by your doctor). Notify your doctor immediately if any of the following symptoms occur:  Severe pain at the injection site  Bleeding or drainage from injection site  Fever 101 degrees F or greater  New or increased weakness/numbness of extremities that does not resolve  Severe headache that disappears or gets better when you lie down  Breathing difficulty  Skin rash  Vomiting  Seizures  Unusual drowsiness    Doctor's Phone Number: [phone]    Follow-up Care: Follow up appointment: 5/19/17 at 10:00AM      DISCHARGE SUMMARY from Nurse    PATIENT INSTRUCTIONS:    After general anesthesia or intravenous sedation, for 24 hours or while taking prescription Narcotics:  · Limit your activities  · Do not drive and operate hazardous machinery  · Do not make important personal or business decisions  · Do  not drink alcoholic beverages  · If you have not urinated within 8 hours after discharge, please contact your surgeon on call. *  Please give a list of your current medications to your Primary Care Provider.     *  Please update this list whenever your medications are discontinued, doses are      changed, or new medications (including over-the-counter products) are added. *  Please carry medication information at all times in case of emergency situations. These are general instructions for a healthy lifestyle:    No smoking/ No tobacco products/ Avoid exposure to second hand smoke    Surgeon General's Warning:  Quitting smoking now greatly reduces serious risk to your health. Obesity, smoking, and sedentary lifestyle greatly increases your risk for illness    A healthy diet, regular physical exercise & weight monitoring are important for maintaining a healthy lifestyle    You may be retaining fluid if you have a history of heart failure or if you experience any of the following symptoms:  Weight gain of 3 pounds or more overnight or 5 pounds in a week, increased swelling in our hands or feet or shortness of breath while lying flat in bed. Please call your doctor as soon as you notice any of these symptoms; do not wait until your next office visit. Recognize signs and symptoms of STROKE:    F-face looks uneven    A-arms unable to move or move unevenly    S-speech slurred or non-existent    T-time-call 911 as soon as signs and symptoms begin-DO NOT go       Back to bed or wait to see if you get better-TIME IS BRAIN.

## 2017-04-25 NOTE — BRIEF OP NOTE
BRIEF OPERATIVE NOTE    Date of Procedure: 4/25/2017   Preoperative Diagnosis: Thoracic spinal stenosis [M48.04]  Postoperative Diagnosis: THORACIC SPINAL STENOSIS    Procedure(s):  CERVICAL EPIDURAL STEROID INJECTION#1  Surgeon(s) and Role:     * Nader Kathleen MD - Primary         Assistant Staff:       Surgical Staff:  Circ-1: Jed Herzog RN  Local Nurse Monitor: Gabo Armstrong RN  Scrub Tech-1: Lieutenant Isaiah Mendoza  Event Time In   Incision Start 6487   Incision Close 0148     Anesthesia: Local   Estimated Blood Loss: none  Specimens: * No specimens in log *   Findings: none   Complications: none  Implants: * No implants in log *

## 2017-04-25 NOTE — OP NOTES
[facility]   OPERATIVE REPORT       Name:  Yair Burnham   MR#:  [id number]   :  1975   Account #:  [de-identified]   Date of Adm:  2017       DATE OF SERVICE: 2017     PREOPERATIVE DIAGNOSES   1. Neck pain secondary to cervical spinal stenosis at C5-C6,   combined with cervical disk bulging and degenerative disk   disease. 2. Cervicalgia. 3. Chronic tension headaches and possible cervicogenic   headaches. POSTOPERATIVE DIAGNOSES   1. Neck pain secondary to cervical spinal stenosis at C5-C6,   combined with cervical disk bulging and degenerative disk   disease. 2. Cervicalgia. 3. Chronic tension headaches and possible cervicogenic   headaches. PROCEDURE: Cervical epidural steroid injection #1. SURGEON: Emily Durand MD    ANESTHESIA: Local with IV sedation. 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 She presents today for a   cervical epidural steroid injection at [facility]. DESCRIPTION OF PROCEDURE: After informed consent was obtained, a   22-gauge IV was placed in the right arm, and the patient was   taken to the operating room and positioned in a chair sitting   upright. Monitors were applied and vital signs were stable. The   neck was prepped and draped in the usual sterile fashion and a   small skin wheal was made over the C5-C6 interspace using 1%   lidocaine. Next, an 18-gauge Tuohy needle was advanced using the loss of   resistance technique. Upon loss of resistance, no blood or   cerebrospinal fluid was noted. After negative aspiration, 6 mL   of solution consisting of 4 mL of preservative-free normal   saline and 80 mg of triamcinolone was injected. She tolerated   the procedure well. There were no complications. She was   transferred to the recovery room in satisfactory condition. BLOOD LOSS: None. FLUIDS: None.     COMPLICATIONS: None.    CONDITION: Stable. PLANS   1. I recommended continuing Mobic 15 mg per day. 2. We will hopefully get her the cervical traction collar and I   would recommend that she use it on a regular basis. 3. I will see her back in 4 weeks for followup in my office.         MD BERKLEY Diaz / DARLEEN   D:  04/25/2017   08:50   T:  04/25/2017   09:57   Job #:  [id number]

## 2017-05-08 NOTE — PROGRESS NOTES
[place]  : [facility] [address]  [phone]   [phone]          OUTPATIENT PHYSICAL THERAPY:Discontinuation Summary 2017    ICD-10: Treatment Diagnosis: Cervicalgia (M54.2), Postural kyphosis, cervicothoracic region (M40.03)  Precautions/Allergies:   Review of patient's allergies indicates no known allergies. Fall Risk Score: 0 (? 5 = High Risk)  MD Orders: Evaluate and Treat MEDICAL/REFERRING DIAGNOSIS:  Persons encountering health services in other specified circumstances [Z76.89]   DATE OF ONSET: 2016  REFERRING PHYSICIAN: Wade Farrell MD  RETURN PHYSICIAN APPOINTMENT: TBD     INITIAL ASSESSMENT:  Ms. Mikey Nassar presents neck pain which began in 2016. Her chief complaint is pain and stiffness and describes a near constant sensation of choking around her lower cervical spine. She also presents with decreased cervical mobility, flexibility and strength which limits her ability to perform ADL and work related tasks without pain. She will benefit from skilled therapy to improve her strength and mobility to return to prior level of function. 17: Patient self discharging from therapy after having injection for continued neck pain. Patient to continue with HEP at this time and follow up as needed in future. PROBLEM LIST (Impacting functional limitations):  1. Decreased Strength  2. Decreased ADL/Functional Activities  3. Increased Pain  4. Decreased Activity Tolerance  5. Decreased Flexibility/Joint Mobility  6. Decreased Lafe with Home Exercise Program INTERVENTIONS PLANNED:  1. Heat  2. Home Exercise Program (HEP)  3. Manual Therapy  4. Neuromuscular Re-education/Strengthening  5. Range of Motion (ROM)  6. Therapeutic Exercise/Strengthening  7.  Transcutaneous Electrical Nerve Stimulation (TENS)   TREATMENT PLAN:    GOALS: (Goals have been discussed and agreed upon with patient.)  Discharge Goals: Time Frame: 8  1. Patient will be independent with home exercise program without assistance from therapist. MET  2. Patient will display pain free cervical rotation bilaterally to drive without pain/difficulty. MADE PROGRESS, NOT MET  3. Patient will report pain rated at less than 2/10 at worst to return to full ADL/work performance. MADE PROGRESS, NOT MET  4. Patient will report minimal severity of choking sensation around her neck to return to ADL tasks with less discomfort. MADE PROGRESS, NOT MET  5. Patient will report NDI score of 0/50 to demonstrate improved cervical mobility and function. MADE PROGRESS, NOT MET  Rehabilitation Potential For Stated Goals: Good              The information in this section was collected on 2/1/17 (except where otherwise noted). HISTORY:   History of Present Injury/Illness (Reason for Referral):  [place] presents with neck pain which began in August 2016 after noticing small mass at her anterior neck. She reports near constant choking sensation around her neck which also limits her neck mobility. She reports having had ultrasound and CT scans without insignificant findings and is scheduled to follow up with allergist and neurologist for further workup to help determine possible cause of her current symptoms. Along with pain and tightness she also reports brief instance of the back of her tongue going numb but sensation quickly normalized and has not returned in the past few weeks. Her goal is to treat the muscular components to her pain and return to normal daily tasks without pain. Past Medical History/Comorbidities:   Ms. Luiz Motley  has a past medical history of Allergic rhinitis (1/3/2017); Anxiety; Chronic pain; DDD (degenerative disc disease), cervical; GERD (gastroesophageal reflux disease); H/O seasonal allergies; and Morbid obesity ([facility] [address] She also has no past medical history of Adverse effect of anesthesia;  Difficult intubation; Malignant hyperthermia due to anesthesia; Nausea & vomiting; or Pseudocholinesterase deficiency. Ms. Allie Kapadia  has a past surgical history that includes  section. Social History/Living Environment:     Lives in private setting home, no barriers to progress. 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 Dominant Side:         RIGHT  Current Medications:       Current Outpatient Prescriptions:     Omeprazole delayed release (PRILOSEC D/R) 20 mg tablet, Take 20 mg by mouth daily. , Disp: , Rfl:     montelukast (SINGULAIR) 10 mg tablet, Take 1 Tab by mouth daily. (Patient taking differently: Take 10 mg by mouth nightly. Indications: ALLERGIC RHINITIS), Disp: 90 Tab, Rfl: 3    cyclobenzaprine (FLEXERIL) 5 mg tablet, TAKE ONE TABLET BY MOUTH THREE TIMES A DAY as needed, Disp: 90 Tab, Rfl: 3    triamcinolone (NASACORT AQ) 55 mcg nasal inhaler, 2 Sprays by Both Nostrils route daily. , Disp: 1 Bottle, Rfl: 5    loratadine (CLARITIN) 10 mg tablet, Take 1 Tab by mouth daily. , Disp: 90 Tab, Rfl: 1    clonazePAM (KLONOPIN) 0.5 mg tablet, Take 1 Tab by mouth three (3) times daily as needed. Max Daily Amount: 1.5 mg. (Patient taking differently: Take 0.5 mg by mouth three (3) times daily as needed for Pain (anxiety). ), Disp: 20 Tab, Rfl: 0   Date Last Reviewed:  2017     Number of Personal Factors/Comorbidities that affect the Plan of Care: 0: LOW COMPLEXITY   EXAMINATION:   Observation/Orthostatic Postural Assessment:  Valma Stage presents with moderate rounded shoulders and forward head posture. Significant kyphosis noted at cervicothoracic junction. Palpation:          Patient is tender at suboccipitals and all anterior neck musculature, especially bilateral SCM.    ROM:   Date: 17   Flexion FP   Extension DP   SBL DP   SBR DP   Rot L DP   Rot R  DP   Abbreviations: FP- functional painful, DP- dysfunctional non painful, FN- functional non painful, FP- functional painful   Strength:          5/5 gross upper extrermity strength   Neurological Screen:        Dermatomes:  Normal        Reflexes:  Normal        Neural Tension Tests:  Normal        Sensation: Normal           (SB=sidebending, Rot=rotation, TTP=tender to palpation, ULTTA=upper limb tension test-A, UQS=upper quarter scan, WNL=within normal limits; ROM measured in degrees)           Body Structures Involved:  1. Joints  2. Muscles  3. Ligaments Body Functions Affected:  1. Sensory/Pain  2. Neuromusculoskeletal  3. Movement Related Activities and Participation Affected:  1. General Tasks and Demands  2. Mobility  3. Self Care  4. Community, Social and [place]   Number of elements (examined above) that affect the Plan of Care: 4+: HIGH COMPLEXITY   CLINICAL PRESENTATION:   Presentation: Evolving clinical presentation with changing clinical characteristics: MODERATE COMPLEXITY   CLINICAL DECISION MAKING:   Outcome Measure: Tool Used: Neck Disability Index (NDI)  Score:  Initial: 6/50 (2/1/17)  Most Recent: X/50 (Date: -- )   Interpretation of Score: The Neck Disability Index is a revised form of the Oswestry Low Back Pain Index and is designed to measure the activities of daily living in person's with neck pain. Each section is scored on a 0-5 scale, 5 representing the greatest disability. The scores of each section are added together for a total score of 50. Medical Necessity:   · Patient is expected to demonstrate progress in strength and range of motion to increase independence with ADL and work tasks. Reason for Services/Other Comments:  · Patient continues to require present interventions due to patient's inability to rotate, look up or down without pain. Use of outcome tool(s) and clinical judgement create a POC that gives a: Clear prediction of patient's progress: LOW COMPLEXITY                   Recommendations: Discharge from physical therapy. Carolina Mtz.  Dereck

## 2017-08-04 ENCOUNTER — HOSPITAL ENCOUNTER (OUTPATIENT)
Dept: GENERAL RADIOLOGY | Age: 42
Discharge: HOME | End: 2017-08-04
Attending: INTERNAL MEDICINE
Payer: COMMERCIAL

## 2017-08-04 VITALS — HEIGHT: 60 IN | WEIGHT: 190 LBS | BODY MASS INDEX: 37.3 KG/M2

## 2017-08-04 DIAGNOSIS — R53.83 FATIGUE, UNSPECIFIED TYPE: ICD-10-CM

## 2017-08-04 DIAGNOSIS — R13.12 OROPHARYNGEAL DYSPHAGIA: ICD-10-CM

## 2017-08-04 DIAGNOSIS — R79.82 CRP ELEVATED: ICD-10-CM

## 2017-08-04 DIAGNOSIS — M54.2 NECK PAIN: ICD-10-CM

## 2017-08-04 PROCEDURE — 74011000250 HC RX REV CODE- 250: Performed by: INTERNAL MEDICINE

## 2017-08-04 PROCEDURE — 74220 X-RAY XM ESOPHAGUS 1CNTRST: CPT

## 2017-08-04 PROCEDURE — 74011000255 HC RX REV CODE- 255: Performed by: INTERNAL MEDICINE

## 2017-08-04 RX ADMIN — BARIUM SULFATE 700 MG: 700 TABLET ORAL at 13:25

## 2017-08-04 RX ADMIN — BARIUM SULFATE 355 ML: 0.6 SUSPENSION ORAL at 13:26

## 2017-08-04 RX ADMIN — BARIUM SULFATE 135 ML: 980 POWDER, FOR SUSPENSION ORAL at 13:25

## 2017-08-04 RX ADMIN — ANTACID/ANTIFLATULENT 4 G: 380; 550; 10; 10 GRANULE, EFFERVESCENT ORAL at 13:24

## 2017-08-07 NOTE — PROGRESS NOTES
See telephone visit for details about these test results. Dysmotility noted. GI referral recommended. Will fax results to rheumatology as well for evaluation.

## 2017-08-15 ENCOUNTER — APPOINTMENT (OUTPATIENT)
Dept: CT IMAGING | Age: 42
End: 2017-08-15
Attending: EMERGENCY MEDICINE
Payer: COMMERCIAL

## 2017-08-15 ENCOUNTER — HOSPITAL ENCOUNTER (EMERGENCY)
Age: 42
Discharge: HOME | End: 2017-08-15
Attending: EMERGENCY MEDICINE
Payer: COMMERCIAL

## 2017-08-15 VITALS
OXYGEN SATURATION: 99 % | BODY MASS INDEX: 37.3 KG/M2 | RESPIRATION RATE: 16 BRPM | DIASTOLIC BLOOD PRESSURE: 87 MMHG | TEMPERATURE: 98.5 F | HEART RATE: 86 BPM | HEIGHT: 60 IN | WEIGHT: 190 LBS | SYSTOLIC BLOOD PRESSURE: 149 MMHG

## 2017-08-15 DIAGNOSIS — R51.9 OCCIPITAL HEADACHE: Primary | ICD-10-CM

## 2017-08-15 LAB
ALBUMIN SERPL BCP-MCNC: 4.1 G/DL (ref 3.5–5)
ALBUMIN/GLOB SERPL: 1.1 {RATIO} (ref 1.2–3.5)
ALP SERPL-CCNC: 49 U/L (ref 50–136)
ALT SERPL-CCNC: 50 U/L (ref 12–65)
ANION GAP BLD CALC-SCNC: 11 MMOL/L (ref 7–16)
AST SERPL W P-5'-P-CCNC: 28 U/L (ref 15–37)
BASOPHILS # BLD: 0 K/UL (ref 0–0.2)
BASOPHILS NFR BLD: 0 % (ref 0–2)
BILIRUB SERPL-MCNC: 0.3 MG/DL (ref 0.2–1.1)
BUN SERPL-MCNC: 13 MG/DL (ref 6–23)
CALCIUM SERPL-MCNC: 9.1 MG/DL (ref 8.3–10.4)
CHLORIDE SERPL-SCNC: 103 MMOL/L (ref 98–107)
CO2 SERPL-SCNC: 28 MMOL/L (ref 21–32)
CREAT SERPL-MCNC: 0.77 MG/DL (ref 0.6–1)
DIFFERENTIAL METHOD BLD: ABNORMAL
EOSINOPHIL # BLD: 0 K/UL (ref 0–0.8)
EOSINOPHIL NFR BLD: 0 % (ref 0.5–7.8)
ERYTHROCYTE [DISTWIDTH] IN BLOOD BY AUTOMATED COUNT: 12.4 % (ref 11.9–14.6)
ERYTHROCYTE [SEDIMENTATION RATE] IN BLOOD: 16 MM/HR (ref 0–20)
GLOBULIN SER CALC-MCNC: 3.8 G/DL (ref 2.3–3.5)
GLUCOSE SERPL-MCNC: 94 MG/DL (ref 65–100)
HCT VFR BLD AUTO: 40.1 % (ref 35.8–46.3)
HGB BLD-MCNC: 14.1 G/DL (ref 11.7–15.4)
IMM GRANULOCYTES # BLD: 0 K/UL (ref 0–0.5)
IMM GRANULOCYTES NFR BLD: 0.3 % (ref 0–5)
LYMPHOCYTES # BLD: 1.2 K/UL (ref 0.5–4.6)
LYMPHOCYTES NFR BLD: 17 % (ref 13–44)
MCH RBC QN AUTO: 32 PG (ref 26.1–32.9)
MCHC RBC AUTO-ENTMCNC: 35.2 G/DL (ref 31.4–35)
MCV RBC AUTO: 91.1 FL (ref 79.6–97.8)
MONOCYTES # BLD: 0.8 K/UL (ref 0.1–1.3)
MONOCYTES NFR BLD: 12 % (ref 4–12)
NEUTS SEG # BLD: 4.8 K/UL (ref 1.7–8.2)
NEUTS SEG NFR BLD: 71 % (ref 43–78)
PLATELET # BLD AUTO: 339 K/UL (ref 150–450)
PMV BLD AUTO: 10.4 FL (ref 10.8–14.1)
POTASSIUM SERPL-SCNC: 3.7 MMOL/L (ref 3.5–5.1)
PROT SERPL-MCNC: 7.9 G/DL (ref 6.3–8.2)
RBC # BLD AUTO: 4.4 M/UL (ref 4.05–5.25)
SODIUM SERPL-SCNC: 142 MMOL/L (ref 136–145)
TSH SERPL DL<=0.005 MIU/L-ACNC: 3.69 UIU/ML (ref 0.36–3.74)
WBC # BLD AUTO: 6.9 K/UL (ref 4.3–11.1)

## 2017-08-15 PROCEDURE — 99283 EMERGENCY DEPT VISIT LOW MDM: CPT | Performed by: EMERGENCY MEDICINE

## 2017-08-15 PROCEDURE — 85025 COMPLETE CBC W/AUTO DIFF WBC: CPT | Performed by: EMERGENCY MEDICINE

## 2017-08-15 PROCEDURE — 84443 ASSAY THYROID STIM HORMONE: CPT | Performed by: EMERGENCY MEDICINE

## 2017-08-15 PROCEDURE — 70450 CT HEAD/BRAIN W/O DYE: CPT

## 2017-08-15 PROCEDURE — 85652 RBC SED RATE AUTOMATED: CPT | Performed by: EMERGENCY MEDICINE

## 2017-08-15 PROCEDURE — 80053 COMPREHEN METABOLIC PANEL: CPT | Performed by: EMERGENCY MEDICINE

## 2017-08-15 PROCEDURE — 87086 URINE CULTURE/COLONY COUNT: CPT | Performed by: EMERGENCY MEDICINE

## 2017-08-15 RX ORDER — MELOXICAM 15 MG/1
15 TABLET ORAL DAILY
COMMUNITY
End: 2018-03-07

## 2017-08-15 RX ORDER — BUTALBITAL, ACETAMINOPHEN AND CAFFEINE 300; 40; 50 MG/1; MG/1; MG/1
1 CAPSULE ORAL
Qty: 12 CAP | Refills: 0 | Status: SHIPPED | OUTPATIENT
Start: 2017-08-15 | End: 2018-03-07

## 2017-08-15 RX ORDER — FLUTICASONE PROPIONATE 50 MCG
2 SPRAY, SUSPENSION (ML) NASAL DAILY
COMMUNITY
End: 2018-06-06 | Stop reason: SDUPTHER

## 2017-08-15 RX ORDER — SODIUM CHLORIDE 0.9 % (FLUSH) 0.9 %
5-10 SYRINGE (ML) INJECTION AS NEEDED
Status: DISCONTINUED | OUTPATIENT
Start: 2017-08-15 | End: 2017-08-16 | Stop reason: HOSPADM

## 2017-08-15 RX ORDER — SODIUM CHLORIDE 0.9 % (FLUSH) 0.9 %
5-10 SYRINGE (ML) INJECTION EVERY 8 HOURS
Status: DISCONTINUED | OUTPATIENT
Start: 2017-08-15 | End: 2017-08-16 | Stop reason: HOSPADM

## 2017-08-15 NOTE — ED TRIAGE NOTES
Pt states she has had a headache for about 1.5 weeks and not able to get rid of it. States she nausea. Denies BP issues in the past and states a few weeks ago BP was normal. Pain is worse in back of head and radiates into face.

## 2017-08-16 NOTE — ED NOTES
Pt.c/o headache in the back of her head X1 week and \"numbness across the front of her face to the back of her head. \" Pt. States \"i want to get checked for [place] virus. I was on a cruise 1 year ago and may have been exposed. \"

## 2017-08-16 NOTE — ED PROVIDER NOTES
Patient is a 37 y.o. female presenting with headaches. The history is provided by the patient. Headache    This is a new problem. The current episode started more than 1 week ago. The problem occurs constantly. The problem has been gradually worsening. The pain is located in the occipital, frontal and bilateral region. The quality of the pain is described as dull and throbbing. The pain is moderate. Pertinent negatives include no fever, no near-syncope, no shortness of breath, no weakness, no tingling, no dizziness, no visual change, no nausea and no vomiting. She has tried nothing for the symptoms. Past Medical History:   Diagnosis Date    Allergic rhinitis 1/3/2017    Anxiety     Chronic pain     back     DDD (degenerative disc disease), cervical     GERD (gastroesophageal reflux disease)     PRN OTC    H/O seasonal allergies     Morbid obesity (HCC)        Past Surgical History:   Procedure Laterality Date    HX  SECTION      x 2         Family History:   Problem Relation Age of Onset    Elevated Lipids Mother     Elevated Lipids Father     Hypertension Father     Cancer Paternal Aunt      Breast    Diabetes Paternal Grandmother     Heart Disease Paternal Grandmother     Cancer Paternal Grandfather      colon       Social History     Social History    Marital status:      Spouse name: N/A    Number of children: N/A    Years of education: N/A     Occupational History    physician      Social History Main Topics    Smoking status: Never Smoker    Smokeless tobacco: Never Used    Alcohol use No    Drug use: No    Sexual activity: Not on file     Other Topics Concern    Not on file     Social History Narrative      and children 2012             ALLERGIES: Review of patient's allergies indicates no known allergies. Review of Systems   Constitutional: Negative for chills and fever. HENT: Negative for ear pain, sinus pressure and sore throat. Eyes: Negative for pain and visual disturbance. Respiratory: Negative for cough and shortness of breath. Cardiovascular: Negative for chest pain and near-syncope. Gastrointestinal: Negative for nausea and vomiting. Musculoskeletal: Negative for back pain, gait problem, myalgias, neck pain and neck stiffness. Skin: Negative for color change and rash. Neurological: Positive for headaches. Negative for dizziness, tingling, syncope, weakness and numbness. All other systems reviewed and are negative. Vitals:    08/15/17 1823   BP: (!) 190/100   Pulse: (!) 117   Resp: 16   Temp: 98 °F (36.7 °C)   SpO2: 100%   Weight: 86.2 kg (190 lb)   Height: 5' (1.524 m)            Physical Exam   Constitutional: She is oriented to person, place, and time. She appears well-developed and well-nourished. No distress. HENT:   Head: Normocephalic and atraumatic. Mouth/Throat: Oropharynx is clear and moist. No oropharyngeal exudate. Eyes: Conjunctivae and EOM are normal. Pupils are equal, round, and reactive to light. Neck: Normal range of motion. Neck supple. No spinous process tenderness present. Normal range of motion present. Pulmonary/Chest: Breath sounds normal.   Abdominal: No hernia. Neurological: She is alert and oriented to person, place, and time. Coordination and gait normal. GCS eye subscore is 4. GCS verbal subscore is 5. GCS motor subscore is 6. Reflex Scores:       Bicep reflexes are 1+ on the right side and 1+ on the left side. Patellar reflexes are 1+ on the right side and 1+ on the left side. Nl speech  No drift. Normal finger-nose testing. Skin: Skin is warm and dry. Psychiatric: She has a normal mood and affect. Her speech is normal.   Nursing note and vitals reviewed.        MDM  Number of Diagnoses or Management Options  Diagnosis management comments: Migraine headache, muscle skeletal headache including occipital neuralgia, doubt subarachnoid hemorrhage or bacterial meningitis. Fundi are flat so believe benign intracranial hypertension is less likely but still possible. Doubt encephalitis. Doubtful but possible of viral meningitis. Amount and/or Complexity of Data Reviewed  Clinical lab tests: ordered and reviewed  Tests in the radiology section of CPT®: ordered and reviewed  Independent visualization of images, tracings, or specimens: yes    Risk of Complications, Morbidity, and/or Mortality  Presenting problems: moderate  Diagnostic procedures: low  Management options: moderate      ED Course       Procedures    Patient suspects she has [place] because of a cruise she took a number months ago. Results Include:    Recent Results (from the past 24 hour(s))   SED RATE, AUTOMATED    Collection Time: 08/15/17  7:45 PM   Result Value Ref Range    Sed rate, automated 16 0 - 20 mm/hr   TSH 3RD GENERATION    Collection Time: 08/15/17  7:45 PM   Result Value Ref Range    TSH 3.690 0.358 - 3.740 uIU/mL     Ct Head Wo Cont    Result Date: 8/15/2017  HEAD CT WITHOUT CONTRAST  8/15/2017 HISTORY:   Headache  x1.5 weeks. Nausea. TECHNIQUE: Noncontrast axial images were obtained through the brain. All CT scans at this facility used dose modulation, interactive reconstruction and/or weight based dosing when appropriate to reduce radiation dose to as low as reasonably achievable. COMPARISON: 12/25/2016 FINDINGS: There is no acute intracranial hemorrhage, significant mass effect or CT evidence of acute large artery territorial infarction. Please note that a hyperacute infarct or small vessel infarct may not be apparent on initial CT imaging. There is no hydrocephalus , intra-axial mass or abnormal extra-axial fluid collection. There are no displaced skull fractures. The mastoid air cells and paranasal sinuses are clear where imaged. IMPRESSION: No acute findings       Symptoms to me seem more representative of occipital neuralgia.   This discussed with the patient a lumbar puncture for a possibility of xanthochromia, viral meningitis, check opening pressure. Patient declines at this point. Has an appointment with her internist in the morning.

## 2017-08-16 NOTE — DISCHARGE INSTRUCTIONS
[facility] should call tomorrow with information regarding running the test physique. Keep appointment with your internist.  Macarena [place] sooner for worse headache/fever/vomiting/stiffneck/rash/passing out. Headache: Care Instructions  Your Care Instructions    Headaches have many possible causes. Most headaches aren't a sign of a more serious problem, and they will get better on their own. Home treatment may help you feel better faster. The doctor has checked you carefully, but problems can develop later. If you notice any problems or new symptoms, get medical treatment right away. Follow-up care is a key part of your treatment and safety. Be sure to make and go to all appointments, and call your doctor if you are having problems. It's also a good idea to know your test results and keep a list of the medicines you take. How can you care for yourself at home? · Do not drive if you have taken a prescription pain medicine. · Rest in a quiet, dark room until your headache is gone. Close your eyes and try to relax or go to sleep. Don't watch TV or read. · Put a cold, moist cloth or cold pack on the painful area for 10 to 20 minutes at a time. Put a thin cloth between the cold pack and your skin. · Use a warm, moist towel or a heating pad set on low to relax tight shoulder and neck muscles. · Have someone gently massage your neck and shoulders. · Take pain medicines exactly as directed. ¨ If the doctor gave you a prescription medicine for pain, take it as prescribed. ¨ If you are not taking a prescription pain medicine, ask your doctor if you can take an over-the-counter medicine. · Be careful not to take pain medicine more often than the instructions allow, because you may get worse or more frequent headaches when the medicine wears off. · Do not ignore new symptoms that occur with a headache, such as a fever, weakness or numbness, vision changes, or confusion.  These may be signs of a more serious problem. To prevent headaches  · Keep a headache diary so you can figure out what triggers your headaches. Avoiding triggers may help you prevent headaches. Record when each headache began, how long it lasted, and what the pain was like (throbbing, aching, stabbing, or dull). Write down any other symptoms you had with the headache, such as nausea, flashing lights or dark spots, or sensitivity to bright light or loud noise. Note if the headache occurred near your period. List anything that might have triggered the headache, such as certain foods (chocolate, cheese, wine) or odors, smoke, bright light, stress, or lack of sleep. · Find healthy ways to deal with stress. Headaches are most common during or right after stressful times. Take time to relax before and after you do something that has caused a headache in the past.  · Try to keep your muscles relaxed by keeping good posture. Check your jaw, face, neck, and shoulder muscles for tension, and try relaxing them. When sitting at a desk, change positions often, and stretch for 30 seconds each hour. · Get plenty of sleep and exercise. · Eat regularly and well. Long periods without food can trigger a headache. · Treat yourself to a massage. Some people find that regular massages are very helpful in relieving tension. · Limit caffeine by not drinking too much coffee, tea, or soda. But don't quit caffeine suddenly, because that can also give you headaches. · Reduce eyestrain from computers by blinking frequently and looking away from the computer screen every so often. Make sure you have proper eyewear and that your monitor is set up properly, about an arm's length away. · Seek help if you have depression or anxiety. Your headaches may be linked to these conditions. Treatment can both prevent headaches and help with symptoms of anxiety or depression. When should you call for help? Call 911 anytime you think you may need emergency care.  For example, call if:  · You have signs of a stroke. These may include:  ¨ Sudden numbness, paralysis, or weakness in your face, arm, or leg, especially on only one side of your body. ¨ Sudden vision changes. ¨ Sudden trouble speaking. ¨ Sudden confusion or trouble understanding simple statements. ¨ Sudden problems with walking or balance. ¨ A sudden, severe headache that is different from past headaches. Call your doctor now or seek immediate medical care if:  · You have a new or worse headache. · Your headache gets much worse. Where can you learn more? Go to http://conner-nicolas.info/. Enter M271 in the search box to learn more about \"Headache: Care Instructions. \"  Current as of: October 14, 2016  Content Version: 11.3  © 7831-1460 [employer]. Care instructions adapted under license by [employer] (which disclaims liability or warranty for this information). If you have questions about a medical condition or this instruction, always ask your healthcare professional. [place] [address] any warranty or liability for your use of this information.

## 2017-08-18 LAB
BACTERIA SPEC CULT: NORMAL
SERVICE CMNT-IMP: NORMAL

## 2017-08-21 ENCOUNTER — HOSPITAL ENCOUNTER (EMERGENCY)
Age: 42
Discharge: HOME | End: 2017-08-21
Attending: EMERGENCY MEDICINE
Payer: COMMERCIAL

## 2017-08-21 VITALS
DIASTOLIC BLOOD PRESSURE: 73 MMHG | BODY MASS INDEX: 37.5 KG/M2 | OXYGEN SATURATION: 98 % | HEART RATE: 97 BPM | TEMPERATURE: 97.7 F | SYSTOLIC BLOOD PRESSURE: 127 MMHG | WEIGHT: 191 LBS | HEIGHT: 60 IN | RESPIRATION RATE: 16 BRPM

## 2017-08-21 DIAGNOSIS — R42 DIZZINESS: ICD-10-CM

## 2017-08-21 DIAGNOSIS — G44.209 TENSION HEADACHE: Primary | ICD-10-CM

## 2017-08-21 DIAGNOSIS — R11.0 NAUSEA WITHOUT VOMITING: ICD-10-CM

## 2017-08-21 PROCEDURE — 96375 TX/PRO/DX INJ NEW DRUG ADDON: CPT | Performed by: EMERGENCY MEDICINE

## 2017-08-21 PROCEDURE — 99284 EMERGENCY DEPT VISIT MOD MDM: CPT | Performed by: EMERGENCY MEDICINE

## 2017-08-21 PROCEDURE — 96374 THER/PROPH/DIAG INJ IV PUSH: CPT | Performed by: EMERGENCY MEDICINE

## 2017-08-21 PROCEDURE — 74011250636 HC RX REV CODE- 250/636: Performed by: EMERGENCY MEDICINE

## 2017-08-21 PROCEDURE — 96372 THER/PROPH/DIAG INJ SC/IM: CPT | Performed by: EMERGENCY MEDICINE

## 2017-08-21 RX ORDER — ONDANSETRON 8 MG/1
8 TABLET, ORALLY DISINTEGRATING ORAL
Qty: 12 TAB | Refills: 0 | Status: SHIPPED | OUTPATIENT
Start: 2017-08-21 | End: 2018-03-07

## 2017-08-21 RX ORDER — DIPHENHYDRAMINE HYDROCHLORIDE 50 MG/ML
50 INJECTION, SOLUTION INTRAMUSCULAR; INTRAVENOUS
Status: COMPLETED | OUTPATIENT
Start: 2017-08-21 | End: 2017-08-21

## 2017-08-21 RX ORDER — SODIUM CHLORIDE 0.9 % (FLUSH) 0.9 %
5-10 SYRINGE (ML) INJECTION EVERY 8 HOURS
Status: DISCONTINUED | OUTPATIENT
Start: 2017-08-21 | End: 2017-08-21 | Stop reason: HOSPADM

## 2017-08-21 RX ORDER — HYDROMORPHONE HYDROCHLORIDE 1 MG/ML
1 INJECTION, SOLUTION INTRAMUSCULAR; INTRAVENOUS; SUBCUTANEOUS
Status: DISCONTINUED | OUTPATIENT
Start: 2017-08-21 | End: 2017-08-21 | Stop reason: HOSPADM

## 2017-08-21 RX ORDER — MECLIZINE HYDROCHLORIDE 25 MG/1
25 TABLET ORAL
Status: COMPLETED | OUTPATIENT
Start: 2017-08-21 | End: 2017-08-21

## 2017-08-21 RX ORDER — METOCLOPRAMIDE HYDROCHLORIDE 5 MG/ML
10 INJECTION INTRAMUSCULAR; INTRAVENOUS
Status: COMPLETED | OUTPATIENT
Start: 2017-08-21 | End: 2017-08-21

## 2017-08-21 RX ORDER — ONDANSETRON 2 MG/ML
4 INJECTION INTRAMUSCULAR; INTRAVENOUS
Status: DISCONTINUED | OUTPATIENT
Start: 2017-08-21 | End: 2017-08-21 | Stop reason: HOSPADM

## 2017-08-21 RX ORDER — SODIUM CHLORIDE 0.9 % (FLUSH) 0.9 %
5-10 SYRINGE (ML) INJECTION AS NEEDED
Status: DISCONTINUED | OUTPATIENT
Start: 2017-08-21 | End: 2017-08-21 | Stop reason: HOSPADM

## 2017-08-21 RX ORDER — DEXAMETHASONE SODIUM PHOSPHATE 100 MG/10ML
10 INJECTION INTRAMUSCULAR; INTRAVENOUS
Status: COMPLETED | OUTPATIENT
Start: 2017-08-21 | End: 2017-08-21

## 2017-08-21 RX ORDER — KETOROLAC TROMETHAMINE 30 MG/ML
30 INJECTION, SOLUTION INTRAMUSCULAR; INTRAVENOUS
Status: COMPLETED | OUTPATIENT
Start: 2017-08-21 | End: 2017-08-21

## 2017-08-21 RX ORDER — MECLIZINE HYDROCHLORIDE 25 MG/1
25 TABLET ORAL
Qty: 25 TAB | Refills: 0 | Status: SHIPPED | OUTPATIENT
Start: 2017-08-21 | End: 2017-08-31

## 2017-08-21 RX ADMIN — Medication 5 ML: at 10:23

## 2017-08-21 RX ADMIN — DIPHENHYDRAMINE HYDROCHLORIDE 50 MG: 50 INJECTION, SOLUTION INTRAMUSCULAR; INTRAVENOUS at 10:22

## 2017-08-21 RX ADMIN — METOCLOPRAMIDE 10 MG: 5 INJECTION, SOLUTION INTRAMUSCULAR; INTRAVENOUS at 10:22

## 2017-08-21 RX ADMIN — DEXAMETHASONE SODIUM PHOSPHATE 10 MG: 10 INJECTION INTRAMUSCULAR; INTRAVENOUS at 10:22

## 2017-08-21 RX ADMIN — MECLIZINE HYDROCHLORIDE 25 MG: 25 TABLET ORAL at 11:37

## 2017-08-21 RX ADMIN — KETOROLAC TROMETHAMINE 30 MG: 30 INJECTION, SOLUTION INTRAMUSCULAR at 10:22

## 2017-08-21 NOTE — ED TRIAGE NOTES
Pt seen here last week for migraine. Pt states she is still having headaches, worse at night. Pt c/o nausea and dizziness today.

## 2017-08-21 NOTE — ED NOTES
The patient and caregiver was given their discharge instructions and  was given prescriptions. The  patient and caregiver verbalized understanding and had no additional questions. The patient was alert and was discharged via Ambulatory, without additional complaints at time of discharge.   No apparent distress noted

## 2017-08-21 NOTE — ED PROVIDER NOTES
Patient is a 37 y.o. female presenting with dizziness and headaches. The history is provided by the patient. Dizziness   This is a new problem. Episode onset: last night around 9 PM. The problem has not changed since onset. There was no focality noted. Pertinent negatives include no focal weakness, no loss of sensation, no slurred speech, no speech difficulty, no visual change and no disorientation. There has been no fever. Associated symptoms include headaches. Pertinent negatives include no shortness of breath, no chest pain, no vomiting and no nausea. Headache    This is a chronic problem. Episode onset: 3 or more weeks ago. The problem occurs constantly. The problem has not changed since onset. Associated with: intermittent nausea with vomiting this morning. Dizziness since last night, chronic neck problems for several months. The quality of the pain is described as throbbing. Associated symptoms include dizziness. Pertinent negatives include no fever, no shortness of breath, no weakness, no visual change, no nausea and no vomiting.         Past Medical History:   Diagnosis Date    Allergic rhinitis 1/3/2017    Anxiety     Chronic pain     back     DDD (degenerative disc disease), cervical     GERD (gastroesophageal reflux disease)     PRN OTC    H/O seasonal allergies     Morbid obesity (HCC)        Past Surgical History:   Procedure Laterality Date    HX  SECTION      x 2         Family History:   Problem Relation Age of Onset    Elevated Lipids Mother     Elevated Lipids Father     Hypertension Father     Cancer Paternal Aunt      Breast    Diabetes Paternal Grandmother     Heart Disease Paternal Grandmother     Cancer Paternal Grandfather      colon       Social History     Social History    Marital status:      Spouse name: N/A    Number of children: N/A    Years of education: N/A     Occupational History    physician      Social History Main Topics    Smoking status: Never Smoker    Smokeless tobacco: Never Used    Alcohol use No    Drug use: No    Sexual activity: Not on file     Other Topics Concern    Not on file     Social History Narrative      and children 2010, 2012             ALLERGIES: Review of patient's allergies indicates no known allergies. Review of Systems   Constitutional: Negative for chills and fever. HENT: Negative for congestion, rhinorrhea and sore throat. Eyes: Negative for photophobia and redness. Respiratory: Negative for cough and shortness of breath. Cardiovascular: Negative for chest pain and leg swelling. Gastrointestinal: Negative for abdominal pain, diarrhea, nausea and vomiting. Endocrine: Negative for polydipsia and polyuria. Genitourinary: Negative for dysuria, frequency and hematuria. Musculoskeletal: Negative for back pain and myalgias. Neurological: Positive for dizziness and headaches. Negative for focal weakness, speech difficulty, weakness and numbness. Vitals:    08/21/17 0932 08/21/17 0939   BP: 132/76    Pulse: (!) 108    Resp: 20    Temp: 97.5 °F (36.4 °C)    SpO2: 96% 100%   Weight: 86.6 kg (191 lb)    Height: 5' (1.524 m)             Physical Exam   Constitutional: She is oriented to person, place, and time. She appears well-developed and well-nourished. Eyes: Conjunctivae are normal. Pupils are equal, round, and reactive to light. Neck: Normal range of motion. Neck supple. Cardiovascular: Normal rate, regular rhythm and normal heart sounds. No murmur heard. Pulmonary/Chest: Breath sounds normal. No respiratory distress. Abdominal: Soft. She exhibits no distension. There is no tenderness. There is no rebound and no guarding. Musculoskeletal: Normal range of motion. She exhibits no edema or tenderness. nontender in the neck paraspinous muscles and no trigger point discovered. Neurological: She is alert and oriented to person, place, and time. She has normal strength.  No cranial nerve deficit or sensory deficit. She exhibits normal muscle tone. Coordination normal. GCS eye subscore is 4. GCS verbal subscore is 5. GCS motor subscore is 6. Reflex Scores:       Tricep reflexes are 3+ on the right side and 3+ on the left side. Bicep reflexes are 3+ on the right side and 3+ on the left side. Brachioradialis reflexes are 3+ on the right side and 3+ on the left side. Patellar reflexes are 2+ on the right side and 2+ on the left side. nonfocal neurologic exam.  No meningismus. Full range of motion neck without pain. Skin: Skin is warm and dry. Nursing note and vitals reviewed. MDM  Number of Diagnoses or Management Options  Diagnosis management comments: Probable tension headache with a few migraine features with nausea and vomiting. Denies photophobia. No fever or cough or cold symptoms. Headache is chronic in nature. Neuro exam is nonfocal.  Cerebellar exam normal.  No nystagmus noted. Patient had a normal MRI of the brain in March other than a few white matter changes and she saw neurology in follow-up. Patient seen several days ago in the emergency department with a normal head CT. No need for further emergent imaging. No signs of stroke on exam.    12:09 PM  Patient improved and ready for discharge. Amount and/or Complexity of Data Reviewed  Clinical lab tests: reviewed (Urine culture from last ED visit she grew out mixed rosmery greater than 100,000 colonies. No need for treatment.   No symptoms.)  Tests in the radiology section of CPT®: reviewed  Review and summarize past medical records: yes      ED Course       Procedures

## 2017-08-22 ENCOUNTER — HOSPITAL ENCOUNTER (OUTPATIENT)
Dept: CT IMAGING | Age: 42
Discharge: HOME | End: 2017-08-22
Attending: INTERNAL MEDICINE
Payer: COMMERCIAL

## 2017-08-22 DIAGNOSIS — R42 VERTIGO: ICD-10-CM

## 2017-08-22 DIAGNOSIS — R51.9 HEADACHE, UNSPECIFIED HEADACHE TYPE: ICD-10-CM

## 2017-08-22 PROBLEM — M50.90 CERVICAL DISC DISEASE: Status: ACTIVE | Noted: 2017-08-22

## 2017-08-22 PROCEDURE — 74011636320 HC RX REV CODE- 636/320: Performed by: INTERNAL MEDICINE

## 2017-08-22 PROCEDURE — 70496 CT ANGIOGRAPHY HEAD: CPT

## 2017-08-22 RX ADMIN — IOPAMIDOL 80 ML: 755 INJECTION, SOLUTION INTRAVENOUS at 14:30

## 2017-08-23 PROBLEM — N92.6 CATAMENIAL DISORDER: Status: ACTIVE | Noted: 2017-08-23

## 2017-08-23 PROBLEM — G43.009 MIGRAINE WITHOUT AURA AND WITHOUT STATUS MIGRAINOSUS, NOT INTRACTABLE: Status: ACTIVE | Noted: 2017-08-23

## 2017-08-23 PROBLEM — Z79.899 ENCOUNTER FOR MEDICATION MANAGEMENT: Status: ACTIVE | Noted: 2017-08-23

## 2017-08-23 PROBLEM — M54.2 CERVICALGIA: Status: ACTIVE | Noted: 2017-08-23

## 2017-11-08 ENCOUNTER — HOSPITAL ENCOUNTER (OUTPATIENT)
Dept: CT IMAGING | Age: 42
Discharge: HOME | End: 2017-11-08
Attending: ANESTHESIOLOGY
Payer: COMMERCIAL

## 2017-11-08 DIAGNOSIS — R51.9 HEADACHE: ICD-10-CM

## 2017-11-08 DIAGNOSIS — J01.81 OTHER ACUTE RECURRENT SINUSITIS: ICD-10-CM

## 2017-11-08 PROCEDURE — 70486 CT MAXILLOFACIAL W/O DYE: CPT

## 2018-01-16 ENCOUNTER — HOSPITAL ENCOUNTER (OUTPATIENT)
Dept: GENERAL RADIOLOGY | Age: 43
Discharge: HOME | End: 2018-01-16
Attending: INTERNAL MEDICINE
Payer: COMMERCIAL

## 2018-01-16 DIAGNOSIS — R05.9 COUGH: ICD-10-CM

## 2018-01-16 PROCEDURE — 71046 X-RAY EXAM CHEST 2 VIEWS: CPT

## 2018-04-14 ENCOUNTER — HOSPITAL ENCOUNTER (OUTPATIENT)
Dept: MAMMOGRAPHY | Age: 43
Discharge: HOME | End: 2018-04-14
Attending: INTERNAL MEDICINE
Payer: COMMERCIAL

## 2018-04-14 DIAGNOSIS — Z12.31 VISIT FOR SCREENING MAMMOGRAM: ICD-10-CM

## 2018-04-14 PROCEDURE — 77067 SCR MAMMO BI INCL CAD: CPT

## 2018-05-03 PROBLEM — R76.8 HELICOBACTER PYLORI AB+: Status: ACTIVE | Noted: 2018-05-03

## 2018-05-16 ENCOUNTER — HOSPITAL ENCOUNTER (OUTPATIENT)
Dept: ULTRASOUND IMAGING | Age: 43
Discharge: HOME | End: 2018-05-16
Attending: INTERNAL MEDICINE
Payer: COMMERCIAL

## 2018-05-16 DIAGNOSIS — R22.1 MASS IN NECK: ICD-10-CM

## 2018-05-16 PROCEDURE — 76536 US EXAM OF HEAD AND NECK: CPT

## 2018-05-16 NOTE — PROGRESS NOTES
These results are reassuring! Will repeat exam at below f/u appt.     5/24/2018  11:00 AM   CHERYL PHONE APPOINTMENT      [facility]NA OR PRE A   6/6/2018   2:20 PM    Cathie Hamilton MD          [facility]

## 2018-05-29 ENCOUNTER — ANESTHESIA EVENT (OUTPATIENT)
Dept: ENDOSCOPY | Age: 43
End: 2018-05-29
Payer: COMMERCIAL

## 2018-05-29 RX ORDER — SODIUM CHLORIDE, SODIUM LACTATE, POTASSIUM CHLORIDE, CALCIUM CHLORIDE 600; 310; 30; 20 MG/100ML; MG/100ML; MG/100ML; MG/100ML
100 INJECTION, SOLUTION INTRAVENOUS CONTINUOUS
Status: CANCELLED | OUTPATIENT
Start: 2018-05-29

## 2018-05-29 RX ORDER — SODIUM CHLORIDE 0.9 % (FLUSH) 0.9 %
5-10 SYRINGE (ML) INJECTION AS NEEDED
Status: CANCELLED | OUTPATIENT
Start: 2018-05-29

## 2018-05-29 NOTE — ANESTHESIA PREPROCEDURE EVALUATION
Anesthetic History     PONV (associated w SAB for C/S)          Review of Systems / Medical History  Patient summary reviewed, nursing notes reviewed and pertinent labs reviewed    Pulmonary                   Neuro/Psych             Comments: Cervical HNP -- pt recently received epidural steroid injection.   Denies UE neuro deficits Cardiovascular                  Exercise tolerance: >4 METS     GI/Hepatic/Renal     GERD          Comments: Recently treated for H pylori Endo/Other        Obesity and arthritis     Other Findings   Comments: Chronic lumbalgia         Physical Exam    Airway  Mallampati: I  TM Distance: 4 - 6 cm  Neck ROM: normal range of motion   Mouth opening: Normal     Cardiovascular  Regular rate and rhythm,  S1 and S2 normal,  no murmur, click, rub, or gallop             Dental  No notable dental hx       Pulmonary  Breath sounds clear to auscultation               Abdominal  GI exam deferred       Other Findings            Anesthetic Plan    ASA: 2  Anesthesia type: total IV anesthesia            Anesthetic plan and risks discussed with: Patient and Father

## 2018-05-30 ENCOUNTER — HOSPITAL ENCOUNTER (OUTPATIENT)
Age: 43
Setting detail: OUTPATIENT SURGERY
Discharge: HOME | End: 2018-05-30
Attending: INTERNAL MEDICINE | Admitting: INTERNAL MEDICINE
Payer: COMMERCIAL

## 2018-05-30 ENCOUNTER — ANESTHESIA (OUTPATIENT)
Dept: ENDOSCOPY | Age: 43
End: 2018-05-30
Payer: COMMERCIAL

## 2018-05-30 VITALS
RESPIRATION RATE: 20 BRPM | DIASTOLIC BLOOD PRESSURE: 66 MMHG | HEART RATE: 85 BPM | SYSTOLIC BLOOD PRESSURE: 106 MMHG | OXYGEN SATURATION: 96 %

## 2018-05-30 LAB — HCG UR QL: NEGATIVE

## 2018-05-30 PROCEDURE — 81025 URINE PREGNANCY TEST: CPT

## 2018-05-30 PROCEDURE — 74011250636 HC RX REV CODE- 250/636

## 2018-05-30 PROCEDURE — 88312 SPECIAL STAINS GROUP 1: CPT | Performed by: INTERNAL MEDICINE

## 2018-05-30 PROCEDURE — 77030009426 HC FCPS BIOP ENDOSC BSC -B: Performed by: INTERNAL MEDICINE

## 2018-05-30 PROCEDURE — 74011250636 HC RX REV CODE- 250/636: Performed by: ANESTHESIOLOGY

## 2018-05-30 PROCEDURE — 88305 TISSUE EXAM BY PATHOLOGIST: CPT | Performed by: INTERNAL MEDICINE

## 2018-05-30 PROCEDURE — 76040000025: Performed by: INTERNAL MEDICINE

## 2018-05-30 PROCEDURE — 76060000031 HC ANESTHESIA FIRST 0.5 HR: Performed by: INTERNAL MEDICINE

## 2018-05-30 PROCEDURE — 74011000250 HC RX REV CODE- 250

## 2018-05-30 RX ORDER — DEXTROMETHORPHAN/PSEUDOEPHED 2.5-7.5/.8
40 DROPS ORAL
Status: DISCONTINUED | OUTPATIENT
Start: 2018-05-30 | End: 2018-05-30 | Stop reason: HOSPADM

## 2018-05-30 RX ORDER — PROPOFOL 10 MG/ML
INJECTION, EMULSION INTRAVENOUS
Status: DISCONTINUED | OUTPATIENT
Start: 2018-05-30 | End: 2018-05-30 | Stop reason: HOSPADM

## 2018-05-30 RX ORDER — PROPOFOL 10 MG/ML
INJECTION, EMULSION INTRAVENOUS AS NEEDED
Status: DISCONTINUED | OUTPATIENT
Start: 2018-05-30 | End: 2018-05-30 | Stop reason: HOSPADM

## 2018-05-30 RX ORDER — SODIUM CHLORIDE, SODIUM LACTATE, POTASSIUM CHLORIDE, CALCIUM CHLORIDE 600; 310; 30; 20 MG/100ML; MG/100ML; MG/100ML; MG/100ML
100 INJECTION, SOLUTION INTRAVENOUS CONTINUOUS
Status: DISCONTINUED | OUTPATIENT
Start: 2018-05-30 | End: 2018-05-30 | Stop reason: HOSPADM

## 2018-05-30 RX ORDER — LIDOCAINE HYDROCHLORIDE 20 MG/ML
INJECTION, SOLUTION EPIDURAL; INFILTRATION; INTRACAUDAL; PERINEURAL AS NEEDED
Status: DISCONTINUED | OUTPATIENT
Start: 2018-05-30 | End: 2018-05-30 | Stop reason: HOSPADM

## 2018-05-30 RX ORDER — SODIUM CHLORIDE, SODIUM LACTATE, POTASSIUM CHLORIDE, CALCIUM CHLORIDE 600; 310; 30; 20 MG/100ML; MG/100ML; MG/100ML; MG/100ML
100 INJECTION, SOLUTION INTRAVENOUS CONTINUOUS
Status: CANCELLED | OUTPATIENT
Start: 2018-05-30

## 2018-05-30 RX ORDER — SODIUM CHLORIDE 0.9 % (FLUSH) 0.9 %
5-10 SYRINGE (ML) INJECTION AS NEEDED
Status: CANCELLED | OUTPATIENT
Start: 2018-05-30

## 2018-05-30 RX ADMIN — PROPOFOL 140 MCG/KG/MIN: 10 INJECTION, EMULSION INTRAVENOUS at 07:17

## 2018-05-30 RX ADMIN — PROPOFOL 50 MG: 10 INJECTION, EMULSION INTRAVENOUS at 07:20

## 2018-05-30 RX ADMIN — SODIUM CHLORIDE, SODIUM LACTATE, POTASSIUM CHLORIDE, AND CALCIUM CHLORIDE 100 ML/HR: 600; 310; 30; 20 INJECTION, SOLUTION INTRAVENOUS at 06:46

## 2018-05-30 RX ADMIN — PROPOFOL 50 MG: 10 INJECTION, EMULSION INTRAVENOUS at 07:18

## 2018-05-30 RX ADMIN — PROPOFOL 50 MG: 10 INJECTION, EMULSION INTRAVENOUS at 07:24

## 2018-05-30 RX ADMIN — LIDOCAINE HYDROCHLORIDE 20 MG: 20 INJECTION, SOLUTION EPIDURAL; INFILTRATION; INTRACAUDAL; PERINEURAL at 07:16

## 2018-05-30 RX ADMIN — PROPOFOL 50 MG: 10 INJECTION, EMULSION INTRAVENOUS at 07:22

## 2018-05-30 RX ADMIN — PROPOFOL 50 MG: 10 INJECTION, EMULSION INTRAVENOUS at 07:17

## 2018-05-30 NOTE — H&P
History and Physical for Outpatient Procedure             Date: 2018     History of Present Illness:  Patient presents to undergo EGD. Past Medical History:   Diagnosis Date    Allergic rhinitis 1/3/2017    Anxiety     no meds at present    Chronic pain     back     DDD (degenerative disc disease), cervical     C5-6    GERD (gastroesophageal reflux disease)     controlled with med    H/O seasonal allergies     Helicobacter pylori ab+ 5/3/2018    Nausea & vomiting     WITH BOTH C- SECT    Obese     bmi =38     Past Surgical History:   Procedure Laterality Date    HX  SECTION      x 2    NEUROLOGICAL PROCEDURE UNLISTED      HERBIE     In and  Family History   Problem Relation Age of Onset    Elevated Lipids Mother     Elevated Lipids Father     Hypertension Father     Cancer Paternal Aunt      Breast    Breast Cancer Paternal Aunt     Diabetes Paternal Grandmother     Heart Disease Paternal Grandmother     Cancer Paternal Grandfather      colon     Social History   Substance Use Topics    Smoking status: Never Smoker    Smokeless tobacco: Never Used    Alcohol use Yes      Comment: RARE        No Known Allergies  Current Facility-Administered Medications   Medication Dose Route Frequency    lactated Ringers infusion  100 mL/hr IntraVENous CONTINUOUS    simethicone (MYLICON) 08CH/6.9TZ oral drops 40 mg  40 mg Other ENDO ONCE        Review of Systems:  A detailed 10 organ review of systems is obtained with pertinent positives as listed in the History of Present Illness. All others are negative. Objective:     Physical Exam:  Visit Vitals    /72    Pulse (!) 103    Resp 18    LMP Comment: negative preg test    SpO2 97%    Breastfeeding No      General:  Alert and oriented. Heart: Regular rate and rhythm  Lungs:  Clear to auscultation bilaterally  Abdomen: Soft, nontender, nondistended    Impression/Plan:     Proceed with EGD as planned.  I have discussed with the patient the technique, benefits, alternatives, and risks of these procedures, including medication reaction, immediate or delayed bleeding, or perforation of the gastrointestinal tract.      Signed By: Tiffanie Gilliland MD     May 30, 2018

## 2018-05-30 NOTE — DISCHARGE INSTRUCTIONS
Gastrointestinal Esophagogastroduodenoscopy (EGD) - Upper Exam Discharge Instructions    1. Call Dr. Darshan Gomez at [phone] for any problems or questions. 2. Contact the doctor's office for follow up appointment as directed. 3. Medication may cause drowsiness for several hours, therefore, do not drive or                  operate machinery for remainder of the day. 4. No alcohol today. 5. Ordinarily, you may resume regular diet and activity after exam unless otherwise              specified by your physician. 6. For mild soreness in your throat you may use l throat lozenges or warm               salt-water gargles as needed. Any additional instructions:   Follow up as needed

## 2018-05-30 NOTE — IP AVS SNAPSHOT
[facility] 
 
 
 [address] [place] [address] 
[phone] Patient: Delia Leonardo MRN: [id number] :1975 About your hospitalization You were admitted on:  May 30, 2018 You last received care in the:  [facility] You were discharged on:  May 30, 2018 Why you were hospitalized Your primary diagnosis was:  Not on File Follow-up Information None Your Scheduled Appointments 2018  2:20 PM EDT Office Visit with Mindi Wen MD  
[address]) [address] [facility] [address] [facility] [address] [phone] Discharge Orders None A check javy indicates which time of day the medication should be taken. My Medications ASK your doctor about these medications Instructions Each Dose to Equal  
 Morning Noon Evening Bedtime  
 esomeprazole 40 mg capsule Commonly known as:  Slade Brod Your last dose was: Your next dose is: Take 40 mg by mouth two (2) times a day. 40 mg  
    
   
   
   
  
 FLONASE 50 mcg/actuation nasal spray Generic drug:  fluticasone Your last dose was: Your next dose is: 2 Sprays by Both Nostrils route daily. 2 [place] SINGULAIR 10 mg tablet Generic drug:  montelukast  
   
Your last dose was: Your next dose is: Take 10 mg by mouth nightly. 10 mg Discharge Instructions Gastrointestinal Esophagogastroduodenoscopy (EGD) - Upper Exam Discharge Instructions 1. Call Dr. Nubia Goodrich at [phone] for any problems or questions. 2. Contact the doctor's office for follow up appointment as directed. 3. Medication may cause drowsiness for several hours, therefore, do not drive or                  operate machinery for remainder of the day. 4. No alcohol today. 5. Ordinarily, you may resume regular diet and activity after exam unless otherwise              specified by your physician. 6. For mild soreness in your throat you may use l throat lozenges or warm               salt-water gargles as needed. Any additional instructions: Follow up as needed Introducing [facility]! Dear Carole Mcmanus: 
Thank you for requesting a Zok[employer] account. Our records indicate that you already have an active Zokem account. You can access your account anytime at [email or web address] Did you know that you can access your hospital and ER discharge instructions at any time in Zokem? You can also review all of your test results from your hospital stay or ER visit. Additional Information If you have questions, please visit the Frequently Asked Questions section of the Zokem website at [email or web address] Remember, Zokem is NOT to be used for urgent needs. For medical emergencies, dial 911. Now available from your iPhone and Android! Introducing Kaiden Cuellar As a [facility] patient, I wanted to make you aware of our electronic visit tool called Kaiden Juarezcoltfin. Leanne Gutierrezmer 24/7 allows you to connect within minutes with a medical provider 24 hours a day, seven days a week via a mobile device or tablet or logging into a secure website from your computer. You can access Kaiden Juarezcoltfin from anywhere in the [place]. A virtual visit might be right for you when you have a simple condition and feel like you just dont want to get out of bed, or cant get away from work for an appointment, when your regular [facility] provider is not available (evenings, weekends or holidays), or when youre out of town and need minor care.   Electronic visits cost only $49 and if the Kaiden Cuellar provider determines a prescription is needed to treat your condition, one can be electronically transmitted to a nearby pharmacy*. Please take a moment to enroll today if you have not already done so. The enrollment process is free and takes just a few minutes. To enroll, please download the [address] [place] [address] 24/7 medhat to your tablet or phone, or visit [email or web address] to enroll on your computer. And, as an [address] [place] [address] patient with a [employer] account, the results of your visits will be scanned into your electronic medical record and your primary care provider will be able to view the scanned results. We urge you to continue to see your regular [address] [place] [address] provider for your ongoing medical care. And while your primary care provider may not be the one available when you seek a [employer] virtual visit, the peace of mind you get from getting a real diagnosis real time can be priceless. For more information on MyBuilder, view our Frequently Asked Questions (FAQs) at [email or web address]. Sincerely, 
 
Gerardo Johnson MD 
Chief Medical Officer [employer] *:  certain medications cannot be prescribed via MyBuilder Providers Seen During Your Hospitalization Provider Specialty Primary office phone Adore Gomez MD Gastroenterology [phone] Your Primary Care Physician (PCP) Primary Care Physician Office Phone Office Fax Nimco Weinberg [phone] You are allergic to the following No active allergies Recent Documentation Breastfeeding? OB Status Smoking Status No Having regular periods Never Smoker Emergency Contacts Name Discharge Info Relation Home Work Mobile Trevon Hill DISCHARGE CAREGIVER [3] Spouse [3] [phone] Patient Belongings The following personal items are in your possession at time of discharge: 
  Dental Appliances: None  Visual Aid: Glasses Please provide this summary of care documentation to your next provider. Signatures-by signing, you are acknowledging that this After Visit Summary has been reviewed with you and you have received a copy. Patient Signature:  ____________________________________________________________ Date:  ____________________________________________________________  
  
Connye Brod Provider Signature:  ____________________________________________________________ Date:  ____________________________________________________________

## 2018-05-30 NOTE — ROUTINE PROCESS
Pt. Discharged to car by Katelin Adames with family . Vital signs stable. Able to tolerate PO fluids. Passing gas.  Seen by MD.

## 2018-05-30 NOTE — ANESTHESIA POSTPROCEDURE EVALUATION
Post-Anesthesia Evaluation and Assessment    Patient: Tim Bonilla MRN: [id number]  SSN: [id number]    YOB: 1975  Age: 40 y.o. Sex: female       Cardiovascular Function/Vital Signs  Visit Vitals    /66    Pulse 85    Resp 12    SpO2 96%    Breastfeeding No       Patient is status post total IV anesthesia anesthesia for Procedure(s):  ESOPHAGOGASTRODUODENOSCOPY (EGD)  BMI 38  ESOPHAGOGASTRODUODENAL (EGD) BIOPSY  ESOPHAGEAL DILATION. Nausea/Vomiting: None    Postoperative hydration reviewed and adequate. Pain:  Pain Scale 1: Numeric (0 - 10) (05/30/18 0130)  Pain Intensity 1: 0 (05/30/18 1399)   Managed    Neurological Status: At baseline    Mental Status and Level of Consciousness: Arousable    Pulmonary Status:   O2 Device: Nasal cannula (05/30/18 4399)   Adequate oxygenation and airway patent    Complications related to anesthesia: None    Post-anesthesia assessment completed.  No concerns    Signed By: Duwaine Sandifer, MD     May 30, 2018

## 2018-05-30 NOTE — OP NOTES
Gastroenterology Procedure Note           Procedure:  Esophagogastroduodenoscopy    Date of Procedure:  5/30/2018    Patient:  Emeli Louis     1975    Indication:  Nausea, reflux, dysphagia    Sedation:  MAC    Pre-Procedure Physical Exam:    Mental status:  alert and oriented  Airway:  normal oropharyngeal airway and neck mobility  CV:  regular rate and rhythm  Respiratory:  clear to auscultation    Procedure:  A History and Physical has been performed, and patient medication allergies have been  reviewed. Risks of perforation, hemorrhage, adverse drug reaction, and aspiration were discussed. Informed consent was obtained for the procedure, including sedation. The patient was placed in the left lateral decubitus position. The heart rate, oxygen saturations, blood pressure, and response to care were monitored throughout the procedure. The gastroscope was passed through the mouth and advanced under direct vision to the second portion of the duodenum. A careful inspection was made as the gastroscope was withdrawn, including a retroflexed view of the proximal stomach. The patient tolerated the procedure well. Findings:     OROPHARYNX: Cords and pyriform recesses appear normal.   ESOPHAGUS: No overt stenosis was seen in the esophagus. Empiric dilation was performed using a 54 Fr Guerra dilator. STOMACH: A 1 cm sliding-type hiatal hernia was seen. A moderate amount of retained food debris seen the gastric body along the greater curvature. Mild erythematous gastritis was seen in the antrum. Biopsies of the antrum and body were obtained for H pylori. DUODENUM: The bulb and second portions are normal.    Specimen:  Yes    Estimated Blood Loss:  Minimal    Implant:  None           Impression:    Empiric esophageal dilation. Mild gastritis. Small hiatal hernia. Gastric bezoar. Plan:  1. Soft diet today. 2. Advance diet tomorrow as tolerated.   3. Nexium 40 mg twice daily, taken prior to breakfast and dinner. 4. Avoid NSAIDs. 5. Follow-up pathology results. 6. Will schedule a return office visit in 2 months. Patient has been advised to contact us sooner if symptoms worsen.     Signed:  Chris Lawrence MD  5/30/2018  7:17 AM

## 2018-06-06 PROBLEM — E66.9 OBESITY (BMI 35.0-39.9 WITHOUT COMORBIDITY): Status: ACTIVE | Noted: 2018-06-06

## 2018-06-06 PROBLEM — K76.0 FATTY LIVER: Status: ACTIVE | Noted: 2018-06-06

## 2018-06-20 PROBLEM — E66.01 SEVERE OBESITY (BMI 35.0-39.9): Status: ACTIVE | Noted: 2018-06-20

## 2019-05-04 ENCOUNTER — HOSPITAL ENCOUNTER (OUTPATIENT)
Dept: MAMMOGRAPHY | Age: 44
Discharge: HOME | End: 2019-05-04
Attending: INTERNAL MEDICINE
Payer: COMMERCIAL

## 2019-05-04 DIAGNOSIS — Z12.31 VISIT FOR SCREENING MAMMOGRAM: ICD-10-CM

## 2019-05-04 PROCEDURE — 77063 BREAST TOMOSYNTHESIS BI: CPT

## 2019-10-03 PROBLEM — E78.5 HYPERLIPIDEMIA: Status: ACTIVE | Noted: 2019-10-03

## 2020-02-12 ENCOUNTER — HOSPITAL ENCOUNTER (OUTPATIENT)
Dept: CT IMAGING | Age: 45
Discharge: HOME | End: 2020-02-12
Attending: INTERNAL MEDICINE
Payer: COMMERCIAL

## 2020-02-12 DIAGNOSIS — M54.2 NECK PAIN: ICD-10-CM

## 2020-02-12 DIAGNOSIS — R13.10 PAIN WITH SWALLOWING: ICD-10-CM

## 2020-02-12 DIAGNOSIS — R22.1 NECK MASS: ICD-10-CM

## 2020-02-12 PROCEDURE — 74011636320 HC RX REV CODE- 636/320: Performed by: INTERNAL MEDICINE

## 2020-02-12 PROCEDURE — 70491 CT SOFT TISSUE NECK W/DYE: CPT

## 2020-02-12 PROCEDURE — 74011000258 HC RX REV CODE- 258: Performed by: INTERNAL MEDICINE

## 2020-02-12 RX ORDER — SODIUM CHLORIDE 0.9 % (FLUSH) 0.9 %
10 SYRINGE (ML) INJECTION
Status: COMPLETED | OUTPATIENT
Start: 2020-02-12 | End: 2020-02-12

## 2020-02-12 RX ADMIN — SODIUM CHLORIDE 100 ML: 900 INJECTION, SOLUTION INTRAVENOUS at 15:07

## 2020-02-12 RX ADMIN — IOPAMIDOL 100 ML: 755 INJECTION, SOLUTION INTRAVENOUS at 15:07

## 2020-02-12 RX ADMIN — Medication 10 ML: at 15:07

## 2020-07-29 ENCOUNTER — HOSPITAL ENCOUNTER (OUTPATIENT)
Dept: MAMMOGRAPHY | Age: 45
Discharge: HOME | End: 2020-07-29
Attending: INTERNAL MEDICINE
Payer: COMMERCIAL

## 2020-07-29 DIAGNOSIS — Z12.31 SCREENING MAMMOGRAM FOR HIGH-RISK PATIENT: ICD-10-CM

## 2020-07-29 PROCEDURE — 77063 BREAST TOMOSYNTHESIS BI: CPT

## 2021-07-01 ENCOUNTER — TRANSCRIBE ORDER (OUTPATIENT)
Dept: SCHEDULING | Age: 46
End: 2021-07-01

## 2021-07-01 DIAGNOSIS — Z12.31 VISIT FOR SCREENING MAMMOGRAM: Primary | ICD-10-CM

## 2021-08-07 ENCOUNTER — APPOINTMENT (OUTPATIENT)
Dept: CT IMAGING | Age: 46
End: 2021-08-07
Attending: EMERGENCY MEDICINE
Payer: COMMERCIAL

## 2021-08-07 ENCOUNTER — HOSPITAL ENCOUNTER (EMERGENCY)
Age: 46
Discharge: HOME | End: 2021-08-07
Attending: EMERGENCY MEDICINE
Payer: COMMERCIAL

## 2021-08-07 ENCOUNTER — APPOINTMENT (OUTPATIENT)
Dept: GENERAL RADIOLOGY | Age: 46
End: 2021-08-07
Attending: EMERGENCY MEDICINE
Payer: COMMERCIAL

## 2021-08-07 VITALS
DIASTOLIC BLOOD PRESSURE: 70 MMHG | SYSTOLIC BLOOD PRESSURE: 110 MMHG | RESPIRATION RATE: 20 BRPM | BODY MASS INDEX: 42.33 KG/M2 | OXYGEN SATURATION: 96 % | HEART RATE: 92 BPM | WEIGHT: 210 LBS | TEMPERATURE: 98 F | HEIGHT: 59 IN

## 2021-08-07 DIAGNOSIS — R06.00 DYSPNEA, UNSPECIFIED TYPE: Primary | ICD-10-CM

## 2021-08-07 LAB
ALBUMIN SERPL-MCNC: 4.2 G/DL (ref 3.5–5)
ALBUMIN/GLOB SERPL: 1.1 {RATIO} (ref 1.2–3.5)
ALP SERPL-CCNC: 52 U/L (ref 50–136)
ALT SERPL-CCNC: 50 U/L (ref 12–65)
ANION GAP SERPL CALC-SCNC: 5 MMOL/L (ref 7–16)
AST SERPL-CCNC: 23 U/L (ref 15–37)
ATRIAL RATE: 123 BPM
BASOPHILS # BLD: 0 K/UL (ref 0–0.2)
BASOPHILS NFR BLD: 0 % (ref 0–2)
BILIRUB SERPL-MCNC: 0.3 MG/DL (ref 0.2–1.1)
BUN SERPL-MCNC: 13 MG/DL (ref 6–23)
CALCIUM SERPL-MCNC: 9 MG/DL (ref 8.3–10.4)
CALCULATED P AXIS, ECG09: 71 DEGREES
CALCULATED R AXIS, ECG10: 83 DEGREES
CALCULATED T AXIS, ECG11: 68 DEGREES
CHLORIDE SERPL-SCNC: 106 MMOL/L (ref 98–107)
CO2 SERPL-SCNC: 27 MMOL/L (ref 21–32)
COVID-19 RAPID TEST, COVR: NOT DETECTED
CREAT SERPL-MCNC: 0.78 MG/DL (ref 0.6–1)
D DIMER PPP FEU-MCNC: 0.54 UG/ML(FEU)
DIAGNOSIS, 93000: NORMAL
DIFFERENTIAL METHOD BLD: ABNORMAL
EOSINOPHIL # BLD: 0 K/UL (ref 0–0.8)
EOSINOPHIL NFR BLD: 0 % (ref 0.5–7.8)
ERYTHROCYTE [DISTWIDTH] IN BLOOD BY AUTOMATED COUNT: 12.5 % (ref 11.9–14.6)
GLOBULIN SER CALC-MCNC: 3.7 G/DL (ref 2.3–3.5)
GLUCOSE SERPL-MCNC: 119 MG/DL (ref 65–100)
HCT VFR BLD AUTO: 42.7 % (ref 35.8–46.3)
HGB BLD-MCNC: 14.6 G/DL (ref 11.7–15.4)
IMM GRANULOCYTES # BLD AUTO: 0 K/UL (ref 0–0.5)
IMM GRANULOCYTES NFR BLD AUTO: 0 % (ref 0–5)
LACTATE SERPL-SCNC: 1.5 MMOL/L (ref 0.4–2)
LYMPHOCYTES # BLD: 1.5 K/UL (ref 0.5–4.6)
LYMPHOCYTES NFR BLD: 16 % (ref 13–44)
MAGNESIUM SERPL-MCNC: 2.1 MG/DL (ref 1.8–2.4)
MCH RBC QN AUTO: 31.3 PG (ref 26.1–32.9)
MCHC RBC AUTO-ENTMCNC: 34.2 G/DL (ref 31.4–35)
MCV RBC AUTO: 91.4 FL (ref 79.6–97.8)
MONOCYTES # BLD: 0.9 K/UL (ref 0.1–1.3)
MONOCYTES NFR BLD: 9 % (ref 4–12)
NEUTS SEG # BLD: 7.2 K/UL (ref 1.7–8.2)
NEUTS SEG NFR BLD: 75 % (ref 43–78)
NRBC # BLD: 0 K/UL (ref 0–0.2)
P-R INTERVAL, ECG05: 128 MS
PLATELET # BLD AUTO: 390 K/UL (ref 150–450)
PMV BLD AUTO: 10 FL (ref 9.4–12.3)
POTASSIUM SERPL-SCNC: 3.5 MMOL/L (ref 3.5–5.1)
PROCALCITONIN SERPL-MCNC: <0.05 NG/ML
PROT SERPL-MCNC: 7.9 G/DL (ref 6.3–8.2)
Q-T INTERVAL, ECG07: 306 MS
QRS DURATION, ECG06: 74 MS
QTC CALCULATION (BEZET), ECG08: 438 MS
RBC # BLD AUTO: 4.67 M/UL (ref 4.05–5.2)
SODIUM SERPL-SCNC: 138 MMOL/L (ref 136–145)
SOURCE, COVRS: NORMAL
TROPONIN-HIGH SENSITIVITY: 11.1 PG/ML (ref 0–14)
TROPONIN-HIGH SENSITIVITY: 12 PG/ML (ref 0–14)
TSH SERPL DL<=0.005 MIU/L-ACNC: 0.96 UIU/ML (ref 0.36–3.74)
VENTRICULAR RATE, ECG03: 123 BPM
WBC # BLD AUTO: 9.7 K/UL (ref 4.3–11.1)

## 2021-08-07 PROCEDURE — 74011250636 HC RX REV CODE- 250/636: Performed by: EMERGENCY MEDICINE

## 2021-08-07 PROCEDURE — 84484 ASSAY OF TROPONIN QUANT: CPT

## 2021-08-07 PROCEDURE — 71045 X-RAY EXAM CHEST 1 VIEW: CPT

## 2021-08-07 PROCEDURE — 84145 PROCALCITONIN (PCT): CPT

## 2021-08-07 PROCEDURE — 71260 CT THORAX DX C+: CPT

## 2021-08-07 PROCEDURE — 74011000636 HC RX REV CODE- 636: Performed by: EMERGENCY MEDICINE

## 2021-08-07 PROCEDURE — 87635 SARS-COV-2 COVID-19 AMP PRB: CPT

## 2021-08-07 PROCEDURE — 85025 COMPLETE CBC W/AUTO DIFF WBC: CPT

## 2021-08-07 PROCEDURE — 96361 HYDRATE IV INFUSION ADD-ON: CPT

## 2021-08-07 PROCEDURE — 87040 BLOOD CULTURE FOR BACTERIA: CPT

## 2021-08-07 PROCEDURE — 93005 ELECTROCARDIOGRAM TRACING: CPT | Performed by: EMERGENCY MEDICINE

## 2021-08-07 PROCEDURE — 80053 COMPREHEN METABOLIC PANEL: CPT

## 2021-08-07 PROCEDURE — 85379 FIBRIN DEGRADATION QUANT: CPT

## 2021-08-07 PROCEDURE — 99284 EMERGENCY DEPT VISIT MOD MDM: CPT

## 2021-08-07 PROCEDURE — 84443 ASSAY THYROID STIM HORMONE: CPT

## 2021-08-07 PROCEDURE — 96375 TX/PRO/DX INJ NEW DRUG ADDON: CPT

## 2021-08-07 PROCEDURE — 83735 ASSAY OF MAGNESIUM: CPT

## 2021-08-07 PROCEDURE — 83605 ASSAY OF LACTIC ACID: CPT

## 2021-08-07 PROCEDURE — 96374 THER/PROPH/DIAG INJ IV PUSH: CPT

## 2021-08-07 PROCEDURE — 74011000258 HC RX REV CODE- 258: Performed by: EMERGENCY MEDICINE

## 2021-08-07 RX ORDER — SODIUM CHLORIDE 0.9 % (FLUSH) 0.9 %
5-10 SYRINGE (ML) INJECTION EVERY 8 HOURS
Status: DISCONTINUED | OUTPATIENT
Start: 2021-08-07 | End: 2021-08-08 | Stop reason: HOSPADM

## 2021-08-07 RX ORDER — ONDANSETRON 2 MG/ML
4 INJECTION INTRAMUSCULAR; INTRAVENOUS
Status: COMPLETED | OUTPATIENT
Start: 2021-08-07 | End: 2021-08-07

## 2021-08-07 RX ORDER — DEXAMETHASONE SODIUM PHOSPHATE 100 MG/10ML
10 INJECTION INTRAMUSCULAR; INTRAVENOUS
Status: COMPLETED | OUTPATIENT
Start: 2021-08-07 | End: 2021-08-07

## 2021-08-07 RX ORDER — SODIUM CHLORIDE 0.9 % (FLUSH) 0.9 %
10 SYRINGE (ML) INJECTION
Status: COMPLETED | OUTPATIENT
Start: 2021-08-07 | End: 2021-08-07

## 2021-08-07 RX ORDER — SODIUM CHLORIDE 0.9 % (FLUSH) 0.9 %
5-10 SYRINGE (ML) INJECTION AS NEEDED
Status: DISCONTINUED | OUTPATIENT
Start: 2021-08-07 | End: 2021-08-08 | Stop reason: HOSPADM

## 2021-08-07 RX ADMIN — DEXAMETHASONE SODIUM PHOSPHATE 10 MG: 10 INJECTION, SOLUTION INTRAMUSCULAR; INTRAVENOUS at 18:37

## 2021-08-07 RX ADMIN — SODIUM CHLORIDE 100 ML: 900 INJECTION, SOLUTION INTRAVENOUS at 19:51

## 2021-08-07 RX ADMIN — Medication 10 ML: at 19:51

## 2021-08-07 RX ADMIN — ONDANSETRON 4 MG: 2 INJECTION INTRAMUSCULAR; INTRAVENOUS at 21:49

## 2021-08-07 RX ADMIN — IOPAMIDOL 100 ML: 755 INJECTION, SOLUTION INTRAVENOUS at 19:50

## 2021-08-07 RX ADMIN — SODIUM CHLORIDE 1000 ML: 900 INJECTION, SOLUTION INTRAVENOUS at 16:15

## 2021-08-07 NOTE — ED PROVIDER NOTES
[address] is a 41 y.o. female seen on 8/7/2021 in the [facility] EMERGENCY DEPT in room ER10/10. Chief Complaint   Patient presents with    Concern For COVID-19 (Coronavirus)    Shortness of Breath    Chest Pain     HPI: 43-year-old female presented emergency department with possible COVID-19 infection. Patient is a local physician. She is immunized for COVID-19. Patient states that since last night she has been having chest tightness, fatigue and shortness of breath. She denies any risk factors for DVT or pulmonary embolism. Patient also has a dry cough and some nausea. 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 Patient denies any fevers, chills, abdominal pain, changes in bowel or bladder habits. She has never had symptoms like this before. Patient states that when she was walking earlier today her heart rate increased into the 130s and she became increasingly concerned as her chest tightness continued to get worse. Historian: Patient    REVIEW OF SYSTEMS     Review of Systems   Constitutional: Positive for activity change and fatigue. HENT: Negative. Respiratory: Positive for chest tightness and shortness of breath. Cardiovascular: Positive for palpitations. Negative for leg swelling. Gastrointestinal: Negative. Genitourinary: Negative. Musculoskeletal: Negative. Skin: Negative. Neurological: Negative. Psychiatric/Behavioral: Negative. All other systems reviewed and are negative.       PAST MEDICAL HISTORY     Past Medical History:   Diagnosis Date    Allergic rhinitis 1/3/2017    Anxiety     no meds at present    Chronic pain     back     DDD (degenerative disc disease), cervical     C5-6    Fatty liver 6/6/2018    GERD (gastroesophageal reflux disease)     controlled with med    H/O seasonal allergies     Helicobacter pylori ab+ 5/3/2018    Hyperlipidemia 10/3/2019    Nausea & vomiting     WITH BOTH C- SECT    Obese     bmi =38    Obesity (BMI 35.0-39.9 without comorbidity) 2018     Past Surgical History:   Procedure Laterality Date    HX  SECTION      x 2    HX ENDOSCOPY  2018    EGD - gastric bezoar, mild gastriits, hiatal hernia    NEUROLOGICAL PROCEDURE UNLISTED      HERBIE     Social History     Socioeconomic History    Marital status:      Spouse name: Not on file    Number of children: Not on file    Years of education: Not on file    Highest education level: Not on file   Occupational History    Occupation: physician   Tobacco Use    Smoking status: Never Smoker    Smokeless tobacco: Never Used   Substance and Sexual Activity    Alcohol use: Yes     Comment: RARE    Drug use: No   Social History Narrative      and children ,      Social Determinants of Health     Financial Resource Strain:     Difficulty of Paying Living Expenses:    Food Insecurity:     Worried About [address] in the Last Year:     [address] [employer] in the Last Year:    Transportation Needs:     Lack of Transportation (Medical):  Lack of Transportation (Non-Medical):    Physical Activity:     Days of Exercise per Week:     Minutes of Exercise per Session:    Stress:     Feeling of Stress :    Social Connections:     Frequency of Communication with Friends and Family:     Frequency of Social Gatherings with Friends and Family:     Attends Worship Services:     Active Member of Clubs or Organizations:     Attends Club or Organization Meetings:     Marital Status:      Prior to Admission Medications   Prescriptions Last Dose Informant Patient Reported?  Taking?   acetaminophen (TYLENOL) 500 mg tablet   Yes No   Sig: Take  by mouth every six (6) hours as needed for Pain.   amoxicillin-clavulanate (AUGMENTIN) 875-125 mg per tablet   No No   Sig: Take 1 Tab by mouth every twelve (12) hours. azithromycin (Zithromax Z-Des) 250 mg tablet   No No   Sig: Take 1 Tablet by mouth See Admin Instructions. esomeprazole (NEXIUM) 40 mg capsule   No No   Sig: Take 1 Cap by mouth daily. fluticasone (FLONASE) 50 mcg/actuation nasal spray   No No   Si Sprays by Both Nostrils route daily. loratadine (CLARITIN) 10 mg tablet   Yes No   Sig: Take 10 mg by mouth.   methylPREDNISolone (MEDROL DOSEPACK) 4 mg tablet   No No   Sig: Take as directed. montelukast (Singulair) 10 mg tablet   No No   Sig: Take 1 Tab by mouth nightly. ofloxacin (FLOXIN) 0.3 % ophthalmic solution   No No   Sig: Administer 3 Drops to left eye four (4) times daily. rosuvastatin (CRESTOR) 5 mg tablet   No No   Sig: Take 1 Tab by mouth every other day. Facility-Administered Medications: None     No Known Allergies     PHYSICAL EXAM       Vitals:    21 1444 21 1642 21 1742 21 1901   BP: (!) 159/92 (!) 144/68 (!) 158/71 131/68   Pulse: (!) 123   (!) 105   Resp: 28   19   Temp: 98 °F (36.7 °C)      SpO2: 97% 99% 100% 99%    Vital signs were reviewed. Physical Exam  Vitals and nursing note reviewed. Constitutional:       Appearance: She is well-developed. She is ill-appearing (Appears to not feel well). HENT:      Head: Normocephalic and atraumatic. Mouth/Throat:      Comments: Dry mucous membranes  Eyes:      Extraocular Movements: Extraocular movements intact. Pupils: Pupils are equal, round, and reactive to light. Cardiovascular:      Rate and Rhythm: Regular rhythm. Tachycardia present. Pulmonary:      Effort: No tachypnea or accessory muscle usage. Comments: Mild conversational dyspnea  Chest:      Chest wall: No tenderness. Abdominal:      Palpations: Abdomen is soft. Tenderness: There is no abdominal tenderness. Musculoskeletal:         General: Normal range of motion. Right lower leg: No tenderness. No edema.       Left lower leg: No tenderness. No edema. Skin:     General: Skin is warm and dry. Neurological:      General: No focal deficit present. Mental Status: She is alert and oriented to person, place, and time. Psychiatric:         Mood and Affect: Mood is anxious. Behavior: Behavior normal.          MEDICAL DECISION MAKING     ED Course:    Orders Placed This Encounter    CULTURE, BLOOD    CULTURE, BLOOD    COVID-19 RAPID TEST    XR Chest Port    CT CHEST PULMONARY EMBOLISM    CBC    CMP    MAGNESIUM    Troponin High Sensitivity    Repeat Troponin at 2 hours    LACTIC ACID    LACTIC ACID 2 HOUR REPEAT    PROCALCITONIN    D DIMER    CARDIAC MONITOR - ED ONLY    PULSE OXIMETRY CONTINUOUS    EKG    SALINE LOCK IV ONE TIME Routine    sodium chloride (NS) flush 5-10 mL    sodium chloride (NS) flush 5-10 mL    sodium chloride 0.9 % bolus infusion 1,000 mL    dexamethasone (DECADRON) 10 mg/mL injection 10 mg     Recent Results (from the past 8 hour(s))   EKG, 12 LEAD, INITIAL    Collection Time: 08/07/21  2:41 PM   Result Value Ref Range    Ventricular Rate 123 BPM    Atrial Rate 123 BPM    P-R Interval 128 ms    QRS Duration 74 ms    Q-T Interval 306 ms    QTC Calculation (Bezet) 438 ms    Calculated P Axis 71 degrees    Calculated R Axis 83 degrees    Calculated T Axis 68 degrees    Diagnosis       Sinus tachycardia  Possible Left atrial enlargement  Borderline ECG  When compared with ECG of 28-NOV-2016 16:26,  Vent.  rate has increased BY  44 BPM  Confirmed by YAMIL BARR (), LISA ALONSO ([id number]) on 8/7/2021 5:13:34 PM     PROCALCITONIN    Collection Time: 08/07/21  2:51 PM   Result Value Ref Range    Procalcitonin <0.05 ng/mL   D DIMER    Collection Time: 08/07/21  2:51 PM   Result Value Ref Range    D DIMER 0.54 <0.56 ug/ml(FEU)   CBC WITH AUTOMATED DIFF    Collection Time: 08/07/21  2:53 PM   Result Value Ref Range    WBC 9.7 4.3 - 11.1 K/uL    RBC 4.67 4.05 - 5.2 M/uL    HGB 14.6 11.7 - 15.4 g/dL    HCT 42.7 35.8 - 46.3 %    MCV 91.4 79.6 - 97.8 FL    MCH 31.3 26.1 - 32.9 PG    MCHC 34.2 31.4 - 35.0 g/dL    RDW 12.5 11.9 - 14.6 %    PLATELET 095 380 - 510 K/uL    MPV 10.0 9.4 - 12.3 FL    ABSOLUTE NRBC 0.00 0.0 - 0.2 K/uL    DF AUTOMATED      NEUTROPHILS 75 43 - 78 %    LYMPHOCYTES 16 13 - 44 %    MONOCYTES 9 4.0 - 12.0 %    EOSINOPHILS 0 (L) 0.5 - 7.8 %    BASOPHILS 0 0.0 - 2.0 %    IMMATURE GRANULOCYTES 0 0.0 - 5.0 %    ABS. NEUTROPHILS 7.2 1.7 - 8.2 K/UL    ABS. LYMPHOCYTES 1.5 0.5 - 4.6 K/UL    ABS. MONOCYTES 0.9 0.1 - 1.3 K/UL    ABS. EOSINOPHILS 0.0 0.0 - 0.8 K/UL    ABS. BASOPHILS 0.0 0.0 - 0.2 K/UL    ABS. IMM. GRANS. 0.0 0.0 - 0.5 K/UL   METABOLIC PANEL, COMPREHENSIVE    Collection Time: 08/07/21  2:53 PM   Result Value Ref Range    Sodium 138 136 - 145 mmol/L    Potassium 3.5 3.5 - 5.1 mmol/L    Chloride 106 98 - 107 mmol/L    CO2 27 21 - 32 mmol/L    Anion gap 5 (L) 7 - 16 mmol/L    Glucose 119 (H) 65 - 100 mg/dL    BUN 13 6 - 23 MG/DL    Creatinine 0.78 0.6 - 1.0 MG/DL    GFR est AA >60 >60 ml/min/1.73m2    GFR est non-AA >60 >60 ml/min/1.73m2    Calcium 9.0 8.3 - 10.4 MG/DL    Bilirubin, total 0.3 0.2 - 1.1 MG/DL    ALT (SGPT) 50 12 - 65 U/L    AST (SGOT) 23 15 - 37 U/L    Alk.  phosphatase 52 50 - 136 U/L    Protein, total 7.9 6.3 - 8.2 g/dL    Albumin 4.2 3.5 - 5.0 g/dL    Globulin 3.7 (H) 2.3 - 3.5 g/dL    A-G Ratio 1.1 (L) 1.2 - 3.5     MAGNESIUM    Collection Time: 08/07/21  2:53 PM   Result Value Ref Range    Magnesium 2.1 1.8 - 2.4 mg/dL   TROPONIN-HIGH SENSITIVITY    Collection Time: 08/07/21  2:53 PM   Result Value Ref Range    Troponin-High Sensitivity 11.1 0 - 14 pg/mL   LACTIC ACID    Collection Time: 08/07/21  2:53 PM   Result Value Ref Range    Lactic acid 1.5 0.4 - 2.0 MMOL/L   COVID-19 RAPID TEST    Collection Time: 08/07/21  4:13 PM   Result Value Ref Range    Specimen source Nasopharyngeal      COVID-19 rapid test Not detected NOTD       XR CHEST PORT    Result Date: 8/7/2021  PA CHEST X-RAY 8/7/2021 3:02 PM HISTORY: SOB  ER RAD-Pt reports chest tightness across chest, fatigue, and SOB x last night. Pt states dry cough. Reports nausea. Pt states has some pain in mid/ upper back. 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 COMPARISON: January 16, 2018 FINDINGS:  Heart size is normal. There is no lobar consolidation, pleural effusions or pulmonary edema. No consolidation. EKG interpretation: Rate 123. Sinus tachycardia. Normal [place] and QT intervals. No ischemic changes. ED Course as of Aug 08 1602   Sat Aug 07, 2021   1608 IMPRESSION  No consolidation. XR Chest Port [KH]   1928 Patient will be sign out to Dr. Laura Wen with CT PE pending. [JL]      ED Course User Index  [JL] Hang Erickson DO  [KH] Narciso Cardozo DO       [facility]      Disposition:  Pending  Diagnosis: No diagnosis found. ____________________________________________________________________  A portion of this note was generated using voice recognition dictation software. While the note has been reviewed for accuracy, please note certain words and phrases may not be transcribed as intended and some grammatical and/or typographical errors may be present.

## 2021-08-07 NOTE — ED TRIAGE NOTES
Pt ambulatory to triage. Pt reports chest tightness across chest, fatigue, and SOB x last night. Denies BC or hormones, tobacco use. Pt states dry cough. Reports nausea. Denies v/d, fever, loss of taste or smell, bodyaches. Pt states has some pain in mid/ upper back. 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

## 2021-08-08 NOTE — ED NOTES
I have reviewed discharge instructions with the patient. The patient verbalized understanding. Patient left ED via Discharge Method: ambulatory to Home with family. Opportunity for questions and clarification provided. Patient given 0 scripts. To continue your aftercare when you leave the hospital, you may receive an automated call from our care team to check in on how you are doing. This is a free service and part of our promise to provide the best care and service to meet your aftercare needs.  If you have questions, or wish to unsubscribe from this service please call [phone]. Thank you for Choosing our [facility] Emergency Department.   \

## 2021-08-09 ENCOUNTER — PATIENT OUTREACH (OUTPATIENT)
Dept: OTHER | Age: 46
End: 2021-08-09

## 2021-08-09 NOTE — PROGRESS NOTES
Patient eligible for [facility] care management. Received notification of discharge from [facility] on 08/07/21 for Chest pain, chest tightness, shortness of breath. Contacted patient to discuss post discharge needs and offer care management services. Two patient identifiers verified. Discussed the care management program.  Patient agrees to care management services at this time. PMH:   Past Medical History:   Diagnosis Date    Allergic rhinitis 1/3/2017    Anxiety     no meds at present    Chronic pain     back     DDD (degenerative disc disease), cervical     C5-6    Fatty liver 6/6/2018    GERD (gastroesophageal reflux disease)     controlled with med    H/O seasonal allergies     Helicobacter pylori ab+ 5/3/2018    Hyperlipidemia 10/3/2019    Nausea & vomiting     WITH BOTH C- SECT    Obese     bmi =38    Obesity (BMI 35.0-39.9 without comorbidity) 6/6/2018       42 yo female who presented to the ER with Chest pain, chest tightness, shortness of breath;  · COVID test negative;  CXR negative;   · EKG: sinus tach, possible LA enlargement  · Cardiac Enzymes, CBC, D-dimer, procalcitonin and metabolic panel all negative;     · CT angiogram chest:  No filling defects for PE, the heart is not enlarged, thyroid unremarkable, trachea and bronchi patent, clear, upper abdomen unremarkable;     PMH:  GERD, Dysphagia, Idiopathic Gastroparesis; gastric bezoar; +H pylori in 2018;1cm Hiatal hernia; Empiric Esophageal dilation; mild chronic gastritis; Hyperlipidemia - on Statin in past, fatty liver; Vitamin D deficiency, untreated; cervical neck   · [facility] Workup in 2019:    · Prescribed Nexium, reports nausea in 2019 with use;   · H. Pylori - last treated in April 2018;     · Esophageal dysmotility and vocal cord dysfunction  · Ba swallow normal other than dysmotility 8-2017.    · Idiopathic gastroparesis;  · Allergist workup 2017:    · Impression:  Adverse food reactions, chronic rhiniits, GERD, vocal cord dysfunction, dyspnea;  · No evidence of true IgE mediated food allergy that would explain current symptoms  · Referral to [facility] for Vocal Cord Dysfunction;  · Referral to ENT Yvette Nguyễn evaluate larynx;  · Continue ranitidine, montelukast, loratadine. · Referral to Neurology:  02/08/2017  · Impression:  oropharyngeal dysphagia and no neurologic deficit;  · Laryngoscopy:  mild edema of the interarytenoid region consistent with LPRD  · Use esomeprazole as needed for symptoms. · [place] Arthritis  · Rheumatoid factor + 2017;  · Short course Plaquenil - abnormal LFTs; Discussed COVID-19 related testing which was available at this time. Test results were negative. Patient informed of results, if available? Yes patient was already aware;     Red Flags:  Seek immediate medical care or call 911 if   · Your shortness of breath worsens   · You develop wheezing that worsens  · Your pain worsens or radiates into the back, neck, jaw, belly or in one or both shoulders, arms  · You develop a fast irregular heart beat    Medications:  New Medications at Discharge: none  Changed Medications at Discharge: none  Discontinued Medications at Discharge: none    Performed medication reconciliation with patient, and patient verbalizes understanding of administration of home medications. There were no barriers to obtaining medications identified at this time.     Health Maintenance   Topic Date Due    PAP AKA CERVICAL CYTOLOGY  07/29/2019    Lipid Screen  09/26/2020    Flu Vaccine (1) 09/01/2021    DTaP/Tdap/Td series (2 - Td or Tdap) 10/01/2026    Hepatitis C Screening  Completed    COVID-19 Vaccine  Completed    Pneumococcal 0-64 years  Aged Out       Initial Plan of Care (Identified Needs/Gaps in Care)     Goal:  Demonstrates behaviors to support health and close gaps in care;   · Her in-network PCP moved to [facility] - now out of network Plus plan;  · Has initial appointment with [address] [place] [address] provider for new PCP   · No PCP name listed on appt time and date for 08/24/21 at 1400;  · Will FU tomorrow with patient   · ? possible Gastro causes, any need for GI FU with her history;  · history of +H Pylori, esophageal dilation, reflux, gastroparesis, chronic gastritis;    Goal:  Completes all FU appointments as ordered at [place];  · Current effectiveness of condition self-management;  · No identified barriers to Specialists appointments or care access;  · Help identify any behaviors contributing to symptoms and support change;      Barriers / Support system:  patient    Home health/DME in place per discharge orders:    N/A    Barriers/Challenges to Care: []  Decline in memory      []  Language barrier  []  Emotional  []  Limited mobility []  Lack of motivation     []  Knowledge [] Vision, hearing or cognitive impairment  [] Financial Barriers    []  Lack of support  [x]  Pain    [x]  Shortness of breath with exertion    []  None    PCP/Specialist follow up:     New PCP appt scheduled with [address] [place] [address] on 08/24/21;   CM able to move up Cardiology appt to 08/16/21 from late August;     Future Appointments   Date Time Provider Pratima Ashley   8/16/2021 11:45 AM [facility]   8/18/2021  1:15 PM [facility] ROOM 4 [facility]   8/24/2021  2:00 PM [facility] PROVIDER 1 [facility]       Reviewed red flags with patient, and patient verbalizes understanding. Patient given an opportunity to ask questions. No other clinical/social/functional needs noted. The patient agrees to contact the PCP office for questions related to their healthcare. The patient expressed thanks, offered no additional questions and ended the call.       Plan for next call:   FU with patient on continued Chest Tightness symptoms

## 2021-08-12 LAB
BACTERIA SPEC CULT: NORMAL
SERVICE CMNT-IMP: NORMAL

## 2021-08-16 ENCOUNTER — PATIENT OUTREACH (OUTPATIENT)
Dept: OTHER | Age: 46
End: 2021-08-16

## 2021-08-16 NOTE — PROGRESS NOTES
Care Manager contacted the patient by telephone in follow up. Verified  and zip code with patient as identifiers. 42 yo female who presented to the ER with Chest pain, chest tightness, shortness of breath;  ? COVID test negative;  CXR negative;   ? EKG: sinus tach, possible LA enlargement  ? Cardiac Enzymes, CBC, D-dimer, procalcitonin and metabolic panel all negative;     CTA chest:  no abnormalities    Inpatient Readmission Risk score: No data recorded    Assessment of clinical changes and knowledge demonstrated since last call:   Ongoing Plan of Care:     Goal:  Demonstrates behaviors to support health and close gaps in care;   · Has chosen a new PCP, awaits appt;  · Reports continues with fatigue and nausea  · If quite active, SOB  · Continues with chest tightness;  · Briefly discussed her GI history for other possible explanations;  · history of +H Pylori, esophageal dilation, reflux, gastroparesis, chronic gastritis;    Goal:  Completes all FU appointments as ordered at [place];  · New PCP appt 21 with [address]  · Able to see Dr. Zechariah Milan briefly this week as well  · He agreed with conclusions thus far, viral;   · Cardiology appt on Monday;    Review and discussion of plan of care with patient, who has provided input to plan, verbalized understanding and agrees with current goals. Medication Regimen Change:  Completed a review of medications with patient, who verbalized understanding of how and when to take medications. Barriers / Adherence with medications:    Upcoming Appointments:    Future Appointments   Date Time Provider Pratima Ashley   2021  1:15 PM [facility] ROOM 4 [facility]   2021  2:00 PM [facility] PROVIDER 1 [facility]   2021  1:30 PM ECHO 36 [facility]   10/6/2021  2:00 PM Olu Jeffers  S [facility]       Patient asked questions appropriately and denied any additional needs at this time.   Patient verbalized understanding of all information discussed. Patient has my name and contact information for any follow up needs or questions.      Plan next call:   FU in 10-14 days after Cardiology visit and next steps in workup;

## 2021-08-20 PROBLEM — R76.8 RHEUMATOID FACTOR POSITIVE: Status: ACTIVE | Noted: 2021-08-20

## 2021-08-20 PROBLEM — E66.01 CLASS 3 SEVERE OBESITY DUE TO EXCESS CALORIES WITHOUT SERIOUS COMORBIDITY WITH BODY MASS INDEX (BMI) OF 40.0 TO 44.9 IN ADULT (HCC): Status: ACTIVE | Noted: 2018-06-06

## 2021-08-20 PROBLEM — E55.9 VITAMIN D DEFICIENCY: Status: ACTIVE | Noted: 2021-08-20

## 2021-08-24 PROBLEM — R73.03 PREDIABETES: Status: ACTIVE | Noted: 2021-08-24

## 2021-08-24 PROBLEM — R13.12 OROPHARYNGEAL DYSPHAGIA: Status: RESOLVED | Noted: 2017-02-08 | Resolved: 2021-08-24

## 2021-08-24 PROBLEM — R76.8 HELICOBACTER PYLORI AB+: Status: RESOLVED | Noted: 2018-05-03 | Resolved: 2021-08-24

## 2021-09-06 ENCOUNTER — PATIENT OUTREACH (OUTPATIENT)
Dept: OTHER | Age: 46
End: 2021-09-06

## 2021-09-06 NOTE — PROGRESS NOTES
Care Manager contacted the patient by telephone in follow up. Verified  and zip code with patient as identifiers. 44 yo female who presented to the ER with Chest pain, chest tightness, shortness of breath; Workup negative;  Establish with new PCP office for routine continued care; Has upcoming continued Cardiac workup for ECHO on 21. Resolving current episode of case management. Patient has met patient stated and/or medical goals. Goals Met with Plan of Care:   Completes all FU appointments as ordered at [place];  Demonstrates behaviors to support health and close gaps in care; Patient consistenly demonstrates understanding of the medical action plan through execution of plan. Appointments with key providers are scheduled and attended. Plan of care modified and updated to address new challenges and barriers with minimal support from the CM team (proactively uses physicians and community resources). The support system remains current and has been modified as needed. Patient continues to acquire needed resources from the current support system established. Teach back demonstrates that patient understands education for self management of chronic conditions. Patient consistenly communicates understanding of signs,symptoms and complications for all major diagnoses. Patient modifies his/her lifestyle toreduce or avoid risk factors to his/her health. ECM will follow as needed and patient has ECM contact information for future needs.

## 2021-10-30 ENCOUNTER — HOSPITAL ENCOUNTER (OUTPATIENT)
Dept: MAMMOGRAPHY | Age: 46
Discharge: HOME | End: 2021-10-30
Attending: OBSTETRICS & GYNECOLOGY
Payer: COMMERCIAL

## 2021-10-30 DIAGNOSIS — Z12.31 SCREENING MAMMOGRAM, ENCOUNTER FOR: ICD-10-CM

## 2021-10-30 PROCEDURE — 77063 BREAST TOMOSYNTHESIS BI: CPT

## 2022-01-24 ENCOUNTER — HOSPITAL ENCOUNTER (OUTPATIENT)
Dept: CT IMAGING | Age: 47
Discharge: HOME | End: 2022-01-24
Attending: NURSE PRACTITIONER
Payer: COMMERCIAL

## 2022-01-24 DIAGNOSIS — R07.89 FEELING OF CHEST TIGHTNESS: ICD-10-CM

## 2022-01-24 DIAGNOSIS — R05.9 COUGH: ICD-10-CM

## 2022-01-24 DIAGNOSIS — R06.02 SHORT OF BREATH ON EXERTION: ICD-10-CM

## 2022-01-24 DIAGNOSIS — U07.1 COVID-19: ICD-10-CM

## 2022-01-24 PROCEDURE — 74011000258 HC RX REV CODE- 258: Performed by: NURSE PRACTITIONER

## 2022-01-24 PROCEDURE — 74011000636 HC RX REV CODE- 636: Performed by: NURSE PRACTITIONER

## 2022-01-24 PROCEDURE — 71260 CT THORAX DX C+: CPT

## 2022-01-24 RX ORDER — SODIUM CHLORIDE 0.9 % (FLUSH) 0.9 %
10 SYRINGE (ML) INJECTION
Status: COMPLETED | OUTPATIENT
Start: 2022-01-24 | End: 2022-01-24

## 2022-01-24 RX ADMIN — Medication 10 ML: at 17:36

## 2022-01-24 RX ADMIN — IOPAMIDOL 100 ML: 755 INJECTION, SOLUTION INTRAVENOUS at 17:35

## 2022-01-24 RX ADMIN — SODIUM CHLORIDE 100 ML: 9 INJECTION, SOLUTION INTRAVENOUS at 17:36

## 2022-01-27 ENCOUNTER — HOSPITAL ENCOUNTER (EMERGENCY)
Age: 47
Discharge: HOME | End: 2022-01-27
Attending: EMERGENCY MEDICINE
Payer: COMMERCIAL

## 2022-01-27 ENCOUNTER — APPOINTMENT (OUTPATIENT)
Dept: GENERAL RADIOLOGY | Age: 47
End: 2022-01-27
Attending: EMERGENCY MEDICINE
Payer: COMMERCIAL

## 2022-01-27 VITALS
RESPIRATION RATE: 18 BRPM | DIASTOLIC BLOOD PRESSURE: 81 MMHG | BODY MASS INDEX: 39.27 KG/M2 | SYSTOLIC BLOOD PRESSURE: 156 MMHG | TEMPERATURE: 98.2 F | WEIGHT: 200 LBS | OXYGEN SATURATION: 98 % | HEART RATE: 94 BPM | HEIGHT: 60 IN

## 2022-01-27 DIAGNOSIS — U07.1 COVID-19: Primary | ICD-10-CM

## 2022-01-27 LAB
ALBUMIN SERPL-MCNC: 3.9 G/DL (ref 3.5–5)
ALBUMIN/GLOB SERPL: 1 {RATIO} (ref 1.2–3.5)
ALP SERPL-CCNC: 54 U/L (ref 50–136)
ALT SERPL-CCNC: 46 U/L (ref 12–65)
ANION GAP SERPL CALC-SCNC: 4 MMOL/L (ref 7–16)
AST SERPL-CCNC: 23 U/L (ref 15–37)
BASOPHILS # BLD: 0 K/UL (ref 0–0.2)
BASOPHILS NFR BLD: 0 % (ref 0–2)
BILIRUB SERPL-MCNC: 0.4 MG/DL (ref 0.2–1.1)
BUN SERPL-MCNC: 15 MG/DL (ref 6–23)
CALCIUM SERPL-MCNC: 9.3 MG/DL (ref 8.3–10.4)
CHLORIDE SERPL-SCNC: 102 MMOL/L (ref 98–107)
CO2 SERPL-SCNC: 31 MMOL/L (ref 21–32)
CREAT SERPL-MCNC: 0.78 MG/DL (ref 0.6–1)
DIFFERENTIAL METHOD BLD: ABNORMAL
EOSINOPHIL # BLD: 0 K/UL (ref 0–0.8)
EOSINOPHIL NFR BLD: 0 % (ref 0.5–7.8)
ERYTHROCYTE [DISTWIDTH] IN BLOOD BY AUTOMATED COUNT: 12.1 % (ref 11.9–14.6)
GLOBULIN SER CALC-MCNC: 4 G/DL (ref 2.3–3.5)
GLUCOSE SERPL-MCNC: 118 MG/DL (ref 65–100)
HCT VFR BLD AUTO: 45.2 % (ref 35.8–46.3)
HGB BLD-MCNC: 15.8 G/DL (ref 11.7–15.4)
IMM GRANULOCYTES # BLD AUTO: 0 K/UL (ref 0–0.5)
IMM GRANULOCYTES NFR BLD AUTO: 0 % (ref 0–5)
LYMPHOCYTES # BLD: 1.4 K/UL (ref 0.5–4.6)
LYMPHOCYTES NFR BLD: 15 % (ref 13–44)
MAGNESIUM SERPL-MCNC: 2.1 MG/DL (ref 1.8–2.4)
MCH RBC QN AUTO: 31.5 PG (ref 26.1–32.9)
MCHC RBC AUTO-ENTMCNC: 35 G/DL (ref 31.4–35)
MCV RBC AUTO: 90 FL (ref 79.6–97.8)
MONOCYTES # BLD: 0.9 K/UL (ref 0.1–1.3)
MONOCYTES NFR BLD: 10 % (ref 4–12)
NEUTS SEG # BLD: 7 K/UL (ref 1.7–8.2)
NEUTS SEG NFR BLD: 75 % (ref 43–78)
NRBC # BLD: 0 K/UL (ref 0–0.2)
PLATELET # BLD AUTO: 368 K/UL (ref 150–450)
PMV BLD AUTO: 9.3 FL (ref 9.4–12.3)
POTASSIUM SERPL-SCNC: 3.5 MMOL/L (ref 3.5–5.1)
PROT SERPL-MCNC: 7.9 G/DL (ref 6.3–8.2)
RBC # BLD AUTO: 5.02 M/UL (ref 4.05–5.2)
SODIUM SERPL-SCNC: 137 MMOL/L (ref 136–145)
WBC # BLD AUTO: 9.3 K/UL (ref 4.3–11.1)

## 2022-01-27 PROCEDURE — 85025 COMPLETE CBC W/AUTO DIFF WBC: CPT

## 2022-01-27 PROCEDURE — 93005 ELECTROCARDIOGRAM TRACING: CPT | Performed by: NURSE PRACTITIONER

## 2022-01-27 PROCEDURE — 74011000258 HC RX REV CODE- 258: Performed by: NURSE PRACTITIONER

## 2022-01-27 PROCEDURE — 96375 TX/PRO/DX INJ NEW DRUG ADDON: CPT

## 2022-01-27 PROCEDURE — 96374 THER/PROPH/DIAG INJ IV PUSH: CPT

## 2022-01-27 PROCEDURE — 81003 URINALYSIS AUTO W/O SCOPE: CPT

## 2022-01-27 PROCEDURE — M0247 HC SOTROVIMAB INFUSION: HCPCS

## 2022-01-27 PROCEDURE — 80053 COMPREHEN METABOLIC PANEL: CPT

## 2022-01-27 PROCEDURE — 96361 HYDRATE IV INFUSION ADD-ON: CPT

## 2022-01-27 PROCEDURE — 74011250636 HC RX REV CODE- 250/636: Performed by: NURSE PRACTITIONER

## 2022-01-27 PROCEDURE — 83735 ASSAY OF MAGNESIUM: CPT

## 2022-01-27 PROCEDURE — 99284 EMERGENCY DEPT VISIT MOD MDM: CPT

## 2022-01-27 PROCEDURE — 71046 X-RAY EXAM CHEST 2 VIEWS: CPT

## 2022-01-27 RX ORDER — ONDANSETRON 2 MG/ML
4 INJECTION INTRAMUSCULAR; INTRAVENOUS ONCE
Status: COMPLETED | OUTPATIENT
Start: 2022-01-27 | End: 2022-01-27

## 2022-01-27 RX ADMIN — ONDANSETRON 4 MG: 2 INJECTION INTRAMUSCULAR; INTRAVENOUS at 17:24

## 2022-01-27 RX ADMIN — SODIUM CHLORIDE 1000 ML: 900 INJECTION, SOLUTION INTRAVENOUS at 17:25

## 2022-01-27 RX ADMIN — SODIUM CHLORIDE 500 MG: 9 INJECTION, SOLUTION INTRAVENOUS at 16:15

## 2022-01-27 NOTE — ED NOTES
I have reviewed discharge instructions with the patient. The patient verbalized understanding. Patient left ED via Discharge Method: ambulatory to Home with self. Opportunity for questions and clarification provided. Patient given 0 scripts. To continue your aftercare when you leave the hospital, you may receive an automated call from our care team to check in on how you are doing. This is a free service and part of our promise to provide the best care and service to meet your aftercare needs.  If you have questions, or wish to unsubscribe from this service please call [phone]. Thank you for Choosing our [facility] Emergency Department.

## 2022-01-27 NOTE — ED NOTES
43-year-old female presents to the emergency department with complaints of cough, shortness of breath, chest tightness, elevated heart rate, fatigue, body aches, nausea with no vomiting and diarrhea. States that symptoms began approximately 1 week ago and are increasing in severity. States he tested positive for COVID-19 last Saturday. States that she had all of her household members developed similar symptoms in the same timeframe, they are largely improved, but she is not seeing improvement. States she discussed with her PCP and they advised she present to the emergency department for monoclonal antibody therapy. Percent at length without increased effort. She is tachypneic and hypertensive. Afebrile, no hypoxia  Patient evaluated initially in triage. Rapid Medical Evaluation was conducted and necessary orders have been placed. I have performed a medical screening exam.  Care will now be transferred to the provider in the back of the emergency department.   Humaira Blanco NP 2:01 PM

## 2022-01-27 NOTE — DISCHARGE INSTRUCTIONS
You have been diagnosed with Covid-19. This is a virus and requires supportive care. For body aches and pain, take ibuprofen 800 mg every 8 hours. For fever and pain, take Tylenol 2 tablets every 6 hours. For congestion, take Afrin nose spray twice a day for no more than 3 days. You may additionally take over-the-counter Mucinex or Robitussin to help with cough and congestion. Make sure you are increasing your fluid intake. Rest.  Monitor for oxygen levels and shortness of breath. You should not have an oxygen level less than 90% at rest for an extended period of time. If you do, return to the ED. You are day 6 today. You received an infusion of sotrovimab,.

## 2022-01-27 NOTE — ED TRIAGE NOTES
Pt states she tested positive for COVID on Saturday. Pt c/o fatigue, tachycardia, nausea and diarrhea, chest tightness and shortness of breath. Pt states she is wanting the antibodies.

## 2022-01-27 NOTE — ED PROVIDER NOTES
MORENA Rosamaria Virgen is a 71-year-old female with history of anxiety, degenerative disc disease, HLD and GERD, presenting to the ED for evaluation of Covid19 symptoms. She reports 1 week ago had acute onset of symptoms and tested positive on Saturday. Since that time, her family members of all improved with her symptoms and her is continue to grow worse. She is day 6 today. She is vaccinated x2. Did not receive her booster. Here to get monoclonal therapy. Denies chest pain, abdominal pain, vomiting, fever, headache. He is tolerating p.o. fluids. Has taken intermittent OTC medications for symptoms.     Past Medical History:   Diagnosis Date    Allergic rhinitis 1/3/2017    Anxiety     no meds at present    Chronic pain     back     DDD (degenerative disc disease), cervical     C5-6    Fatty liver 2018    GERD (gastroesophageal reflux disease)     controlled with med    H/O seasonal allergies     Helicobacter pylori ab+ 3/4/8941    Helicobacter pylori ab+ 5/3/2018    Hyperlipidemia 10/3/2019    Nausea & vomiting     WITH BOTH C- SECT    Obese     bmi =38    Obesity (BMI 35.0-39.9 without comorbidity) 2018       Past Surgical History:   Procedure Laterality Date    HX  SECTION      x 2    HX ENDOSCOPY  2018    EGD - gastric bezoar, mild gastriits, hiatal hernia    HX OTHER SURGICAL      root canal, tooth extractions/abscess    NEUROLOGICAL PROCEDURE UNLISTED      HERBIE         Family History:   Problem Relation Age of Onset    Elevated Lipids Mother     Elevated Lipids Father     Hypertension Father     Cancer Paternal Aunt         Breast    Breast Cancer Paternal Aunt         46s    Diabetes Paternal Grandmother     Heart Disease Paternal Grandmother     Colon Cancer Paternal Grandfather         colon    Dementia Maternal Grandmother     Diabetes Maternal Grandmother        Social History     Socioeconomic History    Marital status:      Spouse name: Not on file    Number of children: Not on file    Years of education: Not on file    Highest education level: Not on file   Occupational History    Occupation: physician   Tobacco Use    Smoking status: Never Smoker    Smokeless tobacco: Never Used   Vaping Use    Vaping Use: Never used   Substance and Sexual Activity    Alcohol use: Yes     Comment: RARE    Drug use: No    Sexual activity: Yes     Partners: Male   Other Topics Concern    Not on file   Social History Narrative      and children 2010, 2012     Social Determinants of Health     Financial Resource Strain:     Difficulty of Paying Living Expenses: Not on file   Food Insecurity:     Worried About [address] [employer] [employer] in the Last Year: Not on file    Leoncio of Food in the Last Year: Not on file   Transportation Needs:     Lack of Transportation (Medical): Not on file    Lack of Transportation (Non-Medical): Not on file   Physical Activity:     Days of Exercise per Week: Not on file    Minutes of Exercise per Session: Not on file   Stress:     Feeling of Stress : Not on file   Social Connections:     Frequency of Communication with Friends and Family: Not on file    Frequency of Social Gatherings with Friends and Family: Not on file    Attends Jehovah's witness Services: Not on file    Active Member of [address] [place] [address] or Organizations: Not on file    Attends Club or Organization Meetings: Not on file    Marital Status: Not on file   Intimate Partner Violence:     Fear of Current or Ex-Partner: Not on file    Emotionally Abused: Not on file    Physically Abused: Not on file    Sexually Abused: Not on file   Housing Stability:     Unable to Pay for Housing in the Last Year: Not on file    Number of [place] in the Last Year: Not on file    Unstable Housing in the Last Year: Not on file         ALLERGIES: Patient has no known allergies.     Review of Systems   Constitutional: Positive for activity change (decreased), appetite change (decreased), chills, fatigue and fever. HENT: Negative for congestion and sore throat. Respiratory: Positive for cough and chest tightness. Negative for shortness of breath. Gastrointestinal: Positive for diarrhea and nausea. Negative for abdominal pain and vomiting. Genitourinary: Negative for difficulty urinating. Musculoskeletal: Negative for arthralgias. Skin: Negative for wound. Neurological: Positive for weakness. Negative for headaches. Hematological: Negative. Vitals:    01/27/22 1355 01/27/22 1531 01/27/22 1532   BP: (!) 165/92  (!) 148/95   Pulse: (!) 133  (!) 124   Resp: 17     Temp: 98.2 °F (36.8 °C)     SpO2: 97% 97% 92%   Weight: 90.7 kg (200 lb)     Height: 5' (1.524 m)              Physical Exam  Constitutional:       Appearance: Normal appearance. She is obese. She is ill-appearing. HENT:      Mouth/Throat:      Mouth: Mucous membranes are moist.      Pharynx: Oropharynx is clear. Eyes:      Pupils: Pupils are equal, round, and reactive to light. Cardiovascular:      Rate and Rhythm: Normal rate and regular rhythm. Comments: Patient's heart rate around 95 bpm, increases to around 112 with any type of movement, regular rate and rhythm  Pulmonary:      Effort: Pulmonary effort is normal. No respiratory distress. Breath sounds: Normal breath sounds. Comments: Respirations even and unlabored. Oxygenation 99% at rest.  No tachypnea. No abnormal breath sounds  Abdominal:      General: Bowel sounds are normal.      Tenderness: There is no abdominal tenderness. There is no guarding. Musculoskeletal:      Cervical back: Normal range of motion. Skin:     General: Skin is warm. Neurological:      General: No focal deficit present. Mental Status: She is alert and oriented to person, place, and time. Mental status is at baseline. Psychiatric:         Mood and Affect: Mood normal.         Thought Content:  Thought content normal.        XR CHEST PA LAT Final Result   No radiographic evidence of acute cardiopulmonary disease. MDM Jaqueline Bernheim is a 27-year-old female with history of anxiety, degenerative disc disease, HLD and GERD, presenting to the ED for evaluation of Covid19 symptoms. Blood work ordered from triage shows an white count of 9.3, hemoglobin of 15.8, normal electrolytes, no organ dysfunction or metabolic derangement. Chest x-ray ordered from triage is negative for acute findings. Switchover Mab given as well as a bolus of fluid and Zofran. Ambulatory saturations maintained in the high 90s. Will discharge with strict return precautions. She does monitor her oxygenation at home and does have a prescription for Zofran. PCP follow-up. Safe for discharge at this time. Dispo: Stable, Discharge to home    Diagnosis:   1. covid 19  2. Katy Pedro NP  5:57 PM      Daralene Lennox, NP; 1/27/2022 @5:57 PM Voice dictation software was used during the making of this note. This software is not perfect and grammatical and other typographical errors may be present.   This note has not been proofread for errors.  ===================================================================

## 2022-01-28 ENCOUNTER — PATIENT OUTREACH (OUTPATIENT)
Dept: OTHER | Age: 47
End: 2022-01-28

## 2022-01-28 LAB
ATRIAL RATE: 121 BPM
CALCULATED P AXIS, ECG09: 77 DEGREES
CALCULATED R AXIS, ECG10: 83 DEGREES
CALCULATED T AXIS, ECG11: 71 DEGREES
DIAGNOSIS, 93000: NORMAL
P-R INTERVAL, ECG05: 132 MS
Q-T INTERVAL, ECG07: 306 MS
QRS DURATION, ECG06: 72 MS
QTC CALCULATION (BEZET), ECG08: 434 MS
VENTRICULAR RATE, ECG03: 121 BPM

## 2022-01-29 NOTE — ACP (ADVANCE CARE PLANNING)
Advance Care Planning   Healthcare Decision Maker:       Click here to complete [address] including selection of the Healthcare Decision Maker Relationship (ie \"Primary\")  Today we No decisions on ACP yet

## 2022-01-29 NOTE — PROGRESS NOTES
Patient contacted regarding COVID-19 diagnosis, monoclonal antibody infusion follow up. Discussed COVID-19 related testing which was available at this time. Test results were positive. Patient informed of results, if available? yes. Ambulatory Care Manager contacted the patient by telephone to perform post discharge assessment. Call within 2 business days of discharge: Yes Verified name and  with patient as identifiers. Provided introduction to self, and explanation of the CTN/ACM role, and reason for call due to risk factors for infection and/or exposure to COVID-19. Symptoms reviewed with patient who verbalized the following symptoms: fatigue, pain or aching joints, cough, nausea, diarrhea, fast heart rate and no new symptoms      Due to patient just spoke with her PCP by phone before this CM's call, no need at this time for provider escalation. Discussed follow-up appointments. 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     [facility] follow up appointment(s):   Future Appointments   Date Time Provider Pratima Ashley   3/2/2022  2:20 PM Anamaria Jacobs MD [facility]     [id number] [address] follow up appointment(s): N/A    Potential Risks for Readmission  Risk for dehydration or less than required nutrition with continued nausea, diarrhea  Risk for Injury, potential increase in clotting/embolus risk    Interventions to address risk factors:    Goal: Demonstrates no red flags of worsening symptoms or post COVID complications  Goal: Demonstrates self-management skills to continue daily water and nutritional needs;               · Encouraged hydration once taken Zofran (or even dramamine) to control nausea   · Diarrhea - noted 1L fluids in ER with Zofran  · Hx of hital hernia, gastritis, esophageal dysmotility   · Not needed a low residue diet or H2 Blockade in some time;  · Denies fever, congestion  · Significant full body muscle aches  · Deep, persistent ache, limiting desire or ability for normal function of daily activity;  · Noted patient history shows elevation in RF, Sed Rate, CRP   · Tachycardia which increases in rate with slightest activity  · 24hr s/p monoclonal antibody infusion;   · No angioedema, fever or other red flags of adverse reaction post infusion; Advance Care Planning:   Does patient have an Advance Directive: not on file. Discussed COVID vaccination status: Has received both vaccines, awaiting booster; Education provided on COVID-19 vaccination as appropriate. Discussed exposure protocols and quarantine with CDC Guidelines. Patient was given an opportunity to verbalize any questions and concerns and agrees to contact ACM or health care provider for questions related to their healthcare. Reviewed and educated parent on any new and changed medications related to discharge diagnosis  - no new discharge medications; Was patient discharged with a pulse oximeter? Patient already has her own pulse oximeter at home. Both at rest and with activity O2 Sats remain normal at this time;     ACM provided contact information. Plan for follow-up call in 3-5 days based on severity of symptoms and risk factors.

## 2022-01-30 ENCOUNTER — PATIENT OUTREACH (OUTPATIENT)
Dept: OTHER | Age: 47
End: 2022-01-30

## 2022-01-30 NOTE — PROGRESS NOTES
Care Manager contacted the patient by telephone regarding COVID-19 diagnosis and symptoms follow up, verified 2 identifiers. COVID-19 related test results were positive on 01/22/22 and patient aware of results. Patient has following risk factors of: immunocompromised and Vitamin D Deficiency, PreDiabetes. Symptoms reviewed with patient who reports continued symptoms of: fatigue and some improvement in muscle achiness, weakness, chest tightness, appetite, and diarrhea. .      Due to no new or worsening symptoms encounter was not routed to provider for escalation. Potential Risks for Readmission, Worsening Symptoms  Risk for dehydration or less than required nutrition with continued nausea, diarrhea  Risk for Injury, potential increase in clotting/embolus risk  Activity Intolerance fatigue, hypoxia or pulmonary compromise    Interventions to address risk:   Goal: Demonstrates no red flags of worsening symptoms or post COVID complications  Goal: Demonstrates self-management skills to continue daily water and nutritional needs;    · Reports appetite slightly improving;  · Diarrhea slightly improved, but continues;  · Full body muscles aches, not as bad today;  · Feels can get up and move just a little better today;  · Tachycardia with activity continues;  · No red flags post monoclonal antibody infusion;  · Denies fever, pain, or shortness of breath;  · O2 Sats remain WNL; Patient was given an opportunity to ask questions and voice any new concerns. Patient agrees to contact ACM or health care provider for additional questions related to their healthcare. Medication Regimen Change:  none  Completed a review of medications with patient, who verbalized understanding of how and when to take medications. ACM provided contact information. Plan for follow-up call in 3-5 days based on severity of symptoms and risk factors.

## 2022-02-10 ENCOUNTER — PATIENT OUTREACH (OUTPATIENT)
Dept: OTHER | Age: 47
End: 2022-02-10

## 2022-02-10 NOTE — PROGRESS NOTES
2/10/2022, Patient is currently enrolled in [address] [place] [address].  Start day 1/28/2022, End Date 2/27/2022    Readmission Risk: low    Please see patient outreach encounters for further details.  For questions contact  LAMONT Bundy, RN, [facility] Cell [phone]  Per chart review, no further ED/Hospitalizations noted. 2/10, This writer/ACM covering for ACM, Martha Meraz, will attempt F/U call. 2/10, This writer/ACM attempted to reach associate at listed phone number, left voicemail message with both this ACM and Sweta GATICA contact information and request for return call. PLAN: Will update with MOE/Lucy GATICA and allow her to attempt F/U call next week to discuss any further symptoms/concerns, F/U appts, and any further CM needs. See Previous Documentation:  Care Manager contacted the patient by telephone regarding COVID-19 diagnosis and symptoms follow up, verified 2 identifiers.   COVID-19 related test results were positive on 01/22/22 and patient aware of results.        Patient has following risk factors of: immunocompromised and Vitamin D Deficiency, PreDiabetes.       Symptoms reviewed with patient who reports continued symptoms of: fatigue and some improvement in muscle achiness, weakness, chest tightness, appetite, and diarrhea

## 2022-02-21 ENCOUNTER — PATIENT OUTREACH (OUTPATIENT)
Dept: OTHER | Age: 47
End: 2022-02-21

## 2022-02-21 NOTE — PROGRESS NOTES
Date/Time:  2/21/2022 6:27 PM  Attempted to reach patient by telephone. Left HIPPA compliant message requesting a return call. Will attempt to reach patient again.

## 2022-02-25 ENCOUNTER — PATIENT OUTREACH (OUTPATIENT)
Dept: OTHER | Age: 47
End: 2022-02-25

## 2022-02-25 NOTE — PROGRESS NOTES
Patient resolved from Transition of Care episode on 02/25/2022. 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 Episode of Care resolved. Patient has this ACM contact information if future needs arise.

## 2022-03-18 PROBLEM — E55.9 VITAMIN D DEFICIENCY: Status: ACTIVE | Noted: 2021-08-20

## 2022-03-18 PROBLEM — M54.2 CERVICALGIA: Status: ACTIVE | Noted: 2017-08-23

## 2022-03-19 PROBLEM — R76.8 RHEUMATOID FACTOR POSITIVE: Status: ACTIVE | Noted: 2021-08-20

## 2022-03-19 PROBLEM — M50.90 CERVICAL DISC DISEASE: Status: ACTIVE | Noted: 2017-08-22

## 2022-03-19 PROBLEM — E78.5 HYPERLIPIDEMIA: Status: ACTIVE | Noted: 2019-10-03

## 2022-03-19 PROBLEM — K76.0 FATTY LIVER: Status: ACTIVE | Noted: 2018-06-06

## 2022-03-19 PROBLEM — J30.9 ALLERGIC RHINITIS: Status: ACTIVE | Noted: 2017-01-03

## 2022-03-19 PROBLEM — E66.01 SEVERE OBESITY (BMI 35.0-39.9) WITH COMORBIDITY (HCC): Status: ACTIVE | Noted: 2018-06-06

## 2022-03-19 PROBLEM — G43.009 MIGRAINE WITHOUT AURA AND WITHOUT STATUS MIGRAINOSUS, NOT INTRACTABLE: Status: ACTIVE | Noted: 2017-08-23

## 2022-03-20 PROBLEM — N92.6 CATAMENIAL DISORDER: Status: ACTIVE | Noted: 2017-08-23

## 2022-03-20 PROBLEM — R73.03 PREDIABETES: Status: ACTIVE | Noted: 2021-08-24

## 2022-04-30 ENCOUNTER — HOSPITAL ENCOUNTER (EMERGENCY)
Age: 47
Discharge: HOME | End: 2022-05-01
Attending: EMERGENCY MEDICINE
Payer: COMMERCIAL

## 2022-04-30 ENCOUNTER — APPOINTMENT (OUTPATIENT)
Dept: GENERAL RADIOLOGY | Age: 47
End: 2022-04-30
Attending: EMERGENCY MEDICINE
Payer: COMMERCIAL

## 2022-04-30 ENCOUNTER — APPOINTMENT (OUTPATIENT)
Dept: CT IMAGING | Age: 47
End: 2022-04-30
Attending: EMERGENCY MEDICINE
Payer: COMMERCIAL

## 2022-04-30 DIAGNOSIS — R00.0 TACHYCARDIA: ICD-10-CM

## 2022-04-30 DIAGNOSIS — R07.9 CHEST PAIN, UNSPECIFIED TYPE: Primary | ICD-10-CM

## 2022-04-30 LAB
ALBUMIN SERPL-MCNC: 3.8 G/DL (ref 3.5–5)
ALBUMIN/GLOB SERPL: 1 {RATIO} (ref 1.2–3.5)
ALP SERPL-CCNC: 49 U/L (ref 50–136)
ALT SERPL-CCNC: 42 U/L (ref 12–65)
ANION GAP SERPL CALC-SCNC: 6 MMOL/L (ref 7–16)
AST SERPL-CCNC: 24 U/L (ref 15–37)
BASOPHILS # BLD: 0 K/UL (ref 0–0.2)
BASOPHILS NFR BLD: 1 % (ref 0–2)
BILIRUB SERPL-MCNC: 0.2 MG/DL (ref 0.2–1.1)
BILIRUB UR QL: NEGATIVE
BUN SERPL-MCNC: 11 MG/DL (ref 6–23)
CALCIUM SERPL-MCNC: 9 MG/DL (ref 8.3–10.4)
CHLORIDE SERPL-SCNC: 106 MMOL/L (ref 98–107)
CO2 SERPL-SCNC: 28 MMOL/L (ref 21–32)
COVID-19 RAPID TEST, COVR: NOT DETECTED
CREAT SERPL-MCNC: 0.9 MG/DL (ref 0.6–1)
D DIMER PPP FEU-MCNC: 0.64 UG/ML(FEU)
DIFFERENTIAL METHOD BLD: ABNORMAL
EOSINOPHIL # BLD: 0 K/UL (ref 0–0.8)
EOSINOPHIL NFR BLD: 0 % (ref 0.5–7.8)
ERYTHROCYTE [DISTWIDTH] IN BLOOD BY AUTOMATED COUNT: 11.9 % (ref 11.9–14.6)
FLUAV AG NPH QL IA: NEGATIVE
FLUBV AG NPH QL IA: NEGATIVE
GLOBULIN SER CALC-MCNC: 3.7 G/DL (ref 2.3–3.5)
GLUCOSE SERPL-MCNC: 134 MG/DL (ref 65–100)
GLUCOSE UR QL STRIP.AUTO: NEGATIVE MG/DL
HCT VFR BLD AUTO: 40.7 % (ref 35.8–46.3)
HGB BLD-MCNC: 14.3 G/DL (ref 11.7–15.4)
IMM GRANULOCYTES # BLD AUTO: 0 K/UL (ref 0–0.5)
IMM GRANULOCYTES NFR BLD AUTO: 0 % (ref 0–5)
KETONES UR-MCNC: NEGATIVE MG/DL
LEUKOCYTE ESTERASE UR QL STRIP: NEGATIVE
LYMPHOCYTES # BLD: 1.2 K/UL (ref 0.5–4.6)
LYMPHOCYTES NFR BLD: 16 % (ref 13–44)
MAGNESIUM SERPL-MCNC: 2.1 MG/DL (ref 1.8–2.4)
MCH RBC QN AUTO: 31.8 PG (ref 26.1–32.9)
MCHC RBC AUTO-ENTMCNC: 35.1 G/DL (ref 31.4–35)
MCV RBC AUTO: 90.6 FL (ref 79.6–97.8)
MONOCYTES # BLD: 0.7 K/UL (ref 0.1–1.3)
MONOCYTES NFR BLD: 10 % (ref 4–12)
NEUTS SEG # BLD: 5.6 K/UL (ref 1.7–8.2)
NEUTS SEG NFR BLD: 73 % (ref 43–78)
NITRITE UR QL: NEGATIVE
NRBC # BLD: 0 K/UL (ref 0–0.2)
PH UR: 7 [PH] (ref 5–9)
PLATELET # BLD AUTO: 361 K/UL (ref 150–450)
PMV BLD AUTO: 9.6 FL (ref 9.4–12.3)
POTASSIUM SERPL-SCNC: 3.7 MMOL/L (ref 3.5–5.1)
PROT SERPL-MCNC: 7.5 G/DL (ref 6.3–8.2)
PROT UR QL: NEGATIVE MG/DL
RBC # BLD AUTO: 4.49 M/UL (ref 4.05–5.2)
RBC # UR STRIP: NEGATIVE /UL
SERVICE CMNT-IMP: ABNORMAL
SODIUM SERPL-SCNC: 140 MMOL/L (ref 136–145)
SOURCE, COVRS: NORMAL
SP GR UR: 1.02 (ref 1–1.02)
SPECIMEN SOURCE: NORMAL
TROPONIN-HIGH SENSITIVITY: 7.7 PG/ML (ref 0–14)
TROPONIN-HIGH SENSITIVITY: 9.1 PG/ML (ref 0–14)
TSH SERPL DL<=0.005 MIU/L-ACNC: 2.22 UIU/ML (ref 0.36–3.74)
UROBILINOGEN UR QL: 0.2 EU/DL (ref 0.2–1)
WBC # BLD AUTO: 7.6 K/UL (ref 4.3–11.1)

## 2022-04-30 PROCEDURE — 85379 FIBRIN DEGRADATION QUANT: CPT

## 2022-04-30 PROCEDURE — 83735 ASSAY OF MAGNESIUM: CPT

## 2022-04-30 PROCEDURE — 87635 SARS-COV-2 COVID-19 AMP PRB: CPT

## 2022-04-30 PROCEDURE — 84443 ASSAY THYROID STIM HORMONE: CPT

## 2022-04-30 PROCEDURE — 85025 COMPLETE CBC W/AUTO DIFF WBC: CPT

## 2022-04-30 PROCEDURE — 87804 INFLUENZA ASSAY W/OPTIC: CPT

## 2022-04-30 PROCEDURE — 81003 URINALYSIS AUTO W/O SCOPE: CPT

## 2022-04-30 PROCEDURE — 93005 ELECTROCARDIOGRAM TRACING: CPT | Performed by: EMERGENCY MEDICINE

## 2022-04-30 PROCEDURE — 84484 ASSAY OF TROPONIN QUANT: CPT

## 2022-04-30 PROCEDURE — 99285 EMERGENCY DEPT VISIT HI MDM: CPT

## 2022-04-30 PROCEDURE — 80053 COMPREHEN METABOLIC PANEL: CPT

## 2022-04-30 PROCEDURE — 71045 X-RAY EXAM CHEST 1 VIEW: CPT

## 2022-04-30 RX ORDER — SODIUM CHLORIDE 0.9 % (FLUSH) 0.9 %
10 SYRINGE (ML) INJECTION
Status: COMPLETED | OUTPATIENT
Start: 2022-04-30 | End: 2022-05-01

## 2022-04-30 NOTE — ED PROVIDER NOTES
41-year-old female who presents to the emergency department today with complaints of fatigue, increased heart rate, shortness of breath, nausea, vomiting, and diarrhea. Patient states that her symptoms began on Monday. She reports the vomiting and diarrhea have resolved but she is still having intermittent nausea. When she wakes up in the morning, her heart rate is normal in the 70s or 80s. She states that throughout the day she progressively becomes more fatigued and notices that her heart rate gets up to 130 with exertion. She reports a tightness across the top of her chest.  She denies any fever, cough, or abdominal pain. She does not feel like her symptoms are caused by anxiety. She states that she took some Tylenol and Motrin that did help with some of the chest discomfort but she denies other treatment. The history is provided by the patient.         Past Medical History:   Diagnosis Date    Allergic rhinitis 1/3/2017    Anxiety     no meds at present    Chronic pain     back     DDD (degenerative disc disease), cervical     C5-6    Fatty liver 2018    GERD (gastroesophageal reflux disease)     controlled with med    H/O seasonal allergies     Helicobacter pylori ab+ 9/3/3420    Helicobacter pylori ab+ 5/3/2018    Hyperlipidemia 10/3/2019    Nausea & vomiting     WITH BOTH C- SECT    Obesity (BMI 35.0-39.9 without comorbidity) 2018       Past Surgical History:   Procedure Laterality Date    HX  SECTION      x 2    HX ENDOSCOPY  2018    EGD - gastric bezoar, mild gastriits, hiatal hernia    HX OTHER SURGICAL      root canal, tooth extractions/abscess    NEUROLOGICAL PROCEDURE UNLISTED      HERBIE         Family History:   Problem Relation Age of Onset    Elevated Lipids Mother     Elevated Lipids Father     Hypertension Father     Cancer Paternal Aunt         Breast    Breast Cancer Paternal Aunt         46s    Diabetes Paternal Grandmother     Heart Disease Paternal Grandmother     Colon Cancer Paternal Grandfather         colon    Dementia Maternal Grandmother     Diabetes Maternal Grandmother        Social History     Socioeconomic History    Marital status:      Spouse name: Not on file    Number of children: Not on file    Years of education: Not on file    Highest education level: Not on file   Occupational History    Occupation: physician   Tobacco Use    Smoking status: Never Smoker    Smokeless tobacco: Never Used   Vaping Use    Vaping Use: Never used   Substance and Sexual Activity    Alcohol use: Yes     Comment: RARE    Drug use: No    Sexual activity: Yes     Partners: Male   Other Topics Concern    Not on file   Social History Narrative      and children 2010, 2012     Social Determinants of Health     Financial Resource Strain:     Difficulty of Paying Living Expenses: Not on file   Food Insecurity:     Worried About [address] in the Last Year: Not on file    [address] [employer] in the Last Year: Not on file   Transportation Needs:     Lack of Transportation (Medical): Not on file    Lack of Transportation (Non-Medical):  Not on file   Physical Activity:     Days of Exercise per Week: Not on file    Minutes of Exercise per Session: Not on file   Stress:     Feeling of Stress : Not on file   Social Connections:     Frequency of Communication with Friends and Family: Not on file    Frequency of Social Gatherings with Friends and Family: Not on file    Attends [employer] Services: Not on file    Active Member of Clubs or Organizations: Not on file    Attends Club or Organization Meetings: Not on file    Marital Status: Not on file   Intimate Partner Violence:     Fear of Current or Ex-Partner: Not on file    Emotionally Abused: Not on file    Physically Abused: Not on file    Sexually Abused: Not on file   Housing Stability:     Unable to Pay for Housing in the Last Year: Not on file    Number of Places Lived in the Last Year: Not on file    Unstable Housing in the Last Year: Not on file         ALLERGIES: Stadol [butorphanol]    Review of Systems   Constitutional: Positive for fatigue. Respiratory: Positive for shortness of breath. Cardiovascular: Positive for chest pain. Gastrointestinal: Positive for diarrhea, nausea and vomiting. Negative for abdominal pain. All other systems reviewed and are negative. Vitals:    04/30/22 2141 04/30/22 2218 04/30/22 2345 05/01/22 0051   BP:  128/74 135/83 110/65   Pulse:  88 (!) 105 88   Resp:       Temp: 98.3 °F (36.8 °C)      SpO2:  97% 97% 97%   Weight:       Height:                Physical Exam  Vitals and nursing note reviewed. Constitutional:       General: She is not in acute distress. Appearance: Normal appearance. She is obese. She is not ill-appearing, toxic-appearing or diaphoretic. HENT:      Head: Normocephalic and atraumatic. Right Ear: External ear normal.      Left Ear: External ear normal.      Mouth/Throat:      Mouth: Mucous membranes are moist.      Pharynx: Oropharynx is clear. Eyes:      General: No scleral icterus. Extraocular Movements: Extraocular movements intact. Conjunctiva/sclera: Conjunctivae normal.   Cardiovascular:      Rate and Rhythm: Regular rhythm. Tachycardia present. Heart sounds: Normal heart sounds. Pulmonary:      Effort: Pulmonary effort is normal. No respiratory distress. Breath sounds: Normal breath sounds. No wheezing, rhonchi or rales. Abdominal:      General: Abdomen is flat. There is no distension. Musculoskeletal:         General: Normal range of motion. Cervical back: Normal range of motion and neck supple. No rigidity. Skin:     General: Skin is warm and dry. Capillary Refill: Capillary refill takes less than 2 seconds. Neurological:      General: No focal deficit present. Mental Status: She is alert and oriented to person, place, and time. GCS: GCS eye subscore is 4. GCS verbal subscore is 5. GCS motor subscore is 6. Psychiatric:         Mood and Affect: Mood is anxious. Behavior: Behavior normal.         Thought Content: Thought content normal.         Judgment: Judgment normal.          MDM  Number of Diagnoses or Management Options  Chest pain, unspecified type: new and requires workup  Tachycardia: new and requires workup  Diagnosis management comments: 52-year-old female who presents to the emergency department today with complaints of fatigue, increased heart rate, shortness of breath, nausea, vomiting, and diarrhea. The vomiting and diarrhea have resolved, but she continues to have intermittent nausea. She was initially tachycardic upon arrival to the ED with a HR of 118. HR did decrease to 80-90s without intervention. She has been having some tightness across her upper chest. She has a mildly elevated d dimer but otherwise labs are grossly normal today. I discussed the d dimer with her and she has opted to have a CT PE study, which was negative. Will have her follow-up with cardiology outpatient for further evaluation.  I have discussed the results of all labs, procedures, radiographs, and/or treatments with the patient and available family members. Jannette Donalde is agreed upon by the patient and the patient is ready for discharge.  Questions about treatment in the ED and differential diagnosis of presenting condition were answered. Karlee Rosa was given verbal discharge instructions including, but not limited to, importance of returning to the emergency department for any concern of worsening or continued symptoms.  Instructions were given to follow up with a primary care provider or specialist within 1-2 days. Seymour Sprain effects of medications, if prescribed, were discussed and patient was advised to refrain from significant physical activity until followed up by primary care physician and to not drive or operate heavy machinery after taking any sedating substances.      Agusto Barone NP; 5/1/2022 @1:20 AM Voice dictation software was used during the making of  this note. This software is not perfect and grammatical and other typographical errors  may be present. This note has not been proofread for errors. Amount and/or Complexity of Data Reviewed  Clinical lab tests: reviewed  Tests in the radiology section of CPT®: reviewed  Tests in the medicine section of CPT®: reviewed  Discuss the patient with other providers: yes (Dr. Chase Sanchez with cardiology )    Risk of Complications, Morbidity, and/or Mortality  Presenting problems: moderate  Diagnostic procedures: moderate  Management options: moderate    Patient Progress  Patient progress: improved    ED Course as of 05/01/22 0120   Sat Apr 30, 2022 2027 XR CHEST PORT  Findings:  Frontal view of the chest was obtained.      The lung fields are clear. No pleural effusions are demonstrated. The  cardiomediastinal silhouette is within normal limits. There are no acute  osseous abnormalities.     IMPRESSION     1. No acute cardiopulmonary process. [BC]   2310 Discussed D-dimer results with the patient. Discussed risks and benefits for CT scan. Patient has decided to proceed with CT of the chest. [BC]   Sun May 01, 2022   0120 CT CHEST PULMONARY EMBOLISM  FINDINGS:     There is no pulmonary embolism.     The heart is not enlarged. There is no pericardial effusion. The thoracic aorta  is normal in course and caliber. There is no lymphadenopathy.     There is no endobronchial lesion. There is no focal pulmonary consolidation,  pleural effusion or pneumothorax. No pulmonary edema.     Included upper abdomen shows no gross abnormality.      Surrounding bones are intact. Lucency lesion involving the T5 vertebral body,  likely osseous hemangioma, similar to prior exam.        IMPRESSION     1. Negative for pulmonary embolism.     2. Clear lungs.  No acute pulmonary disease. [BC]      ED Course User Index  [BC] Pratima Sesay NP     Recent Results (from the past 8 hour(s))   EKG, 12 LEAD, INITIAL    Collection Time: 04/30/22  6:03 PM   Result Value Ref Range    Ventricular Rate 109 BPM    Atrial Rate 109 BPM    P-R Interval 128 ms    QRS Duration 74 ms    Q-T Interval 324 ms    QTC Calculation (Bezet) 436 ms    Calculated P Axis 69 degrees    Calculated R Axis 74 degrees    Calculated T Axis 64 degrees    Diagnosis       Sinus tachycardia  Otherwise normal ECG  When compared with ECG of 27-JAN-2022 14:24,  No significant change was found     INFLUENZA A & B AG (RAPID TEST)    Collection Time: 04/30/22  6:10 PM   Result Value Ref Range    Influenza A Ag Negative NEG      Influenza B Ag Negative NEG      Source Nasopharyngeal     MAGNESIUM    Collection Time: 04/30/22  6:10 PM   Result Value Ref Range    Magnesium 2.1 1.8 - 2.4 mg/dL   TSH 3RD GENERATION    Collection Time: 04/30/22  6:10 PM   Result Value Ref Range    TSH 2.220 0.358 - 3.740 uIU/mL   D DIMER    Collection Time: 04/30/22  6:10 PM   Result Value Ref Range    D DIMER 0.64 (H) <0.56 ug/ml(FEU)   TROPONIN-HIGH SENSITIVITY    Collection Time: 04/30/22  6:10 PM   Result Value Ref Range    Troponin-High Sensitivity 7.7 0 - 14 pg/mL   CBC WITH AUTOMATED DIFF    Collection Time: 04/30/22  6:11 PM   Result Value Ref Range    WBC 7.6 4.3 - 11.1 K/uL    RBC 4.49 4.05 - 5.2 M/uL    HGB 14.3 11.7 - 15.4 g/dL    HCT 40.7 35.8 - 46.3 %    MCV 90.6 79.6 - 97.8 FL    MCH 31.8 26.1 - 32.9 PG    MCHC 35.1 (H) 31.4 - 35.0 g/dL    RDW 11.9 11.9 - 14.6 %    PLATELET 356 686 - 162 K/uL    MPV 9.6 9.4 - 12.3 FL    ABSOLUTE NRBC 0.00 0.0 - 0.2 K/uL    DF AUTOMATED      NEUTROPHILS 73 43 - 78 %    LYMPHOCYTES 16 13 - 44 %    MONOCYTES 10 4.0 - 12.0 %    EOSINOPHILS 0 (L) 0.5 - 7.8 %    BASOPHILS 1 0.0 - 2.0 %    IMMATURE GRANULOCYTES 0 0.0 - 5.0 %    ABS. NEUTROPHILS 5.6 1.7 - 8.2 K/UL    ABS. LYMPHOCYTES 1.2 0.5 - 4.6 K/UL    ABS.  MONOCYTES 0.7 0.1 - 1.3 K/UL ABS. EOSINOPHILS 0.0 0.0 - 0.8 K/UL    ABS. BASOPHILS 0.0 0.0 - 0.2 K/UL    ABS. IMM. GRANS. 0.0 0.0 - 0.5 K/UL   METABOLIC PANEL, COMPREHENSIVE    Collection Time: 04/30/22  6:11 PM   Result Value Ref Range    Sodium 140 136 - 145 mmol/L    Potassium 3.7 3.5 - 5.1 mmol/L    Chloride 106 98 - 107 mmol/L    CO2 28 21 - 32 mmol/L    Anion gap 6 (L) 7 - 16 mmol/L    Glucose 134 (H) 65 - 100 mg/dL    BUN 11 6 - 23 MG/DL    Creatinine 0.90 0.6 - 1.0 MG/DL    GFR est AA >60 >60 ml/min/1.73m2    GFR est non-AA >60 >60 ml/min/1.73m2    Calcium 9.0 8.3 - 10.4 MG/DL    Bilirubin, total 0.2 0.2 - 1.1 MG/DL    ALT (SGPT) 42 12 - 65 U/L    AST (SGOT) 24 15 - 37 U/L    Alk.  phosphatase 49 (L) 50 - 136 U/L    Protein, total 7.5 6.3 - 8.2 g/dL    Albumin 3.8 3.5 - 5.0 g/dL    Globulin 3.7 (H) 2.3 - 3.5 g/dL    A-G Ratio 1.0 (L) 1.2 - 3.5     COVID-19 RAPID TEST    Collection Time: 04/30/22  6:11 PM   Result Value Ref Range    Specimen source Nasopharyngeal      COVID-19 rapid test Not detected NOTD     POC URINE MACROSCOPIC    Collection Time: 04/30/22  8:36 PM   Result Value Ref Range    Spec. gravity (POC) 1.025 (H) 1.001 - 1.023      pH, urine  (POC) 7.0 5.0 - 9.0      Protein (POC) Negative NEG mg/dL    Glucose, urine (POC) Negative NEG mg/dL    Ketones (POC) Negative NEG mg/dL    Bilirubin (POC) Negative NEG      Blood (POC) Negative NEG      Urobilinogen (POC) 0.2 0.2 - 1.0 EU/dL    Nitrite (POC) Negative NEG      Leukocyte esterase (POC) Negative NEG      Performed by Prerna Porras    TROPONIN-HIGH SENSITIVITY    Collection Time: 04/30/22  9:36 PM   Result Value Ref Range    Troponin-High Sensitivity 9.1 0 - 14 pg/mL       Procedures

## 2022-04-30 NOTE — ED TRIAGE NOTES
Patient states that her chest is on fire and that he has been having shob,n,v,d fatigue and generally  not feeling good.  Patient states that Tylenol and motrin help with the chest pain

## 2022-05-01 ENCOUNTER — APPOINTMENT (OUTPATIENT)
Dept: CT IMAGING | Age: 47
End: 2022-05-01
Attending: EMERGENCY MEDICINE
Payer: COMMERCIAL

## 2022-05-01 VITALS
DIASTOLIC BLOOD PRESSURE: 65 MMHG | TEMPERATURE: 98.3 F | HEIGHT: 60 IN | RESPIRATION RATE: 16 BRPM | WEIGHT: 205 LBS | OXYGEN SATURATION: 97 % | HEART RATE: 88 BPM | BODY MASS INDEX: 40.25 KG/M2 | SYSTOLIC BLOOD PRESSURE: 110 MMHG

## 2022-05-01 LAB
ATRIAL RATE: 109 BPM
CALCULATED P AXIS, ECG09: 69 DEGREES
CALCULATED R AXIS, ECG10: 74 DEGREES
CALCULATED T AXIS, ECG11: 64 DEGREES
DIAGNOSIS, 93000: NORMAL
P-R INTERVAL, ECG05: 128 MS
Q-T INTERVAL, ECG07: 324 MS
QRS DURATION, ECG06: 74 MS
QTC CALCULATION (BEZET), ECG08: 436 MS
VENTRICULAR RATE, ECG03: 109 BPM

## 2022-05-01 PROCEDURE — 71260 CT THORAX DX C+: CPT

## 2022-05-01 PROCEDURE — 74011000258 HC RX REV CODE- 258: Performed by: EMERGENCY MEDICINE

## 2022-05-01 PROCEDURE — 74011000636 HC RX REV CODE- 636: Performed by: EMERGENCY MEDICINE

## 2022-05-01 RX ADMIN — IOPAMIDOL 100 ML: 755 INJECTION, SOLUTION INTRAVENOUS at 00:12

## 2022-05-01 RX ADMIN — SODIUM CHLORIDE 100 ML: 9 INJECTION, SOLUTION INTRAVENOUS at 00:12

## 2022-05-01 RX ADMIN — Medication 10 ML: at 00:12

## 2022-05-01 NOTE — ED NOTES
Pt given dc instructions and no new take home prescriptions. States she has no further questions at this time and understands that her cardiologist will call her for the next available appointment.  Pt alert and oriented x

## 2022-05-01 NOTE — DISCHARGE INSTRUCTIONS
Rest, drink plenty of fluids, and avoid caffeine. Follow-up with cardiology and your primary care provider. I have sent your information to cardiology's office. They should be calling you for an appointment. Return to the emergency department for any new, worsening, or concerning symptoms.

## 2022-05-04 PROBLEM — B34.9 ACUTE VIRAL DISEASE: Status: ACTIVE | Noted: 2022-05-04

## 2022-05-04 PROBLEM — R06.02 SOB (SHORTNESS OF BREATH): Status: ACTIVE | Noted: 2022-05-04

## 2022-05-04 PROBLEM — R07.1 CHEST PAIN ON BREATHING: Status: ACTIVE | Noted: 2022-05-04

## 2022-06-08 NOTE — PROGRESS NOTES
Ольга Craig M.D. [facility]  [address] , [address]   Office : [phone]  Fax : [phone]    Chief Complaint   Patient presents with    Follow-up       History of Present Illness:  Meenu Sanchez is a 40 y.o. female. HPI  81-year-old female presents for 1 month follow-up in regards to her chest tightness, shortness of breath, tachycardia. See HPI from 5/3/2022 for full details. She had a recent ED visit with extensive work-up including troponin, EKG, CT, lab work, all of which was unremarkable. She had a repeat EKG in the office which was unremarkable. She did have recent symptoms that were likely a viral infection with associated diarrhea. She also had a severe case of COVID-19 infection earlier in the year with symptoms extending into January and February. Consider etiology of pleuritis with associated costochondritis. She was provided a steroid taper. Thought to also have element of anxiety. She was provided as needed Klonopin. Recheck some labs. Also evaluated hormones with reports of flushing to evaluate for premenopausal symptoms. Referred to cardiology. She was seen by cardiology on 5/4/2022. An echocardiogram was ordered. It was thought that her symptoms may likely still be related to recent viral infection. She was given a small dose of Toprol to help with her tachycardia. She had a repeat follow-up visit on 5/13/2022. At that time she thought she was doing better with the beta-blocker and steroids. Her echo was overall unremarkable without any significant valvular disease or effusion. There was discussion of possibly weaning her off the beta-blocker once her heart rate improves. Patient followed up with cardiology today. At that time she stated that she was feeling better since her last visit.   She still endorsed feeling a vague discomfort across her chest but she believes it is gotten better. She has been working and doing her elliptical but is still nervous about doing it. Plan is to do a stress echocardiogram.    Pt has been continuing to take metformin 500 mg XR. She wishes to gradually progress back into working out and working on weight loss. OBGYN: Pap smear with Dr. Elizabeth Hooks earlier this year (Feb 2022). Also had a mammogram November 2021. Past Medical History:  Past Medical History:   Diagnosis Date    Allergic rhinitis 1/3/2017    Anxiety     no meds at present    Chronic pain     back     DDD (degenerative disc disease), cervical     C5-6    Fatty liver 6/6/2018    GERD (gastroesophageal reflux disease)     controlled with med    H/O seasonal allergies     Helicobacter pylori ab+ 4/1/1112    Helicobacter pylori ab+ 5/3/2018    Hyperlipidemia 10/3/2019    Nausea & vomiting     WITH BOTH C- SECT    Obesity (BMI 35.0-39.9 without comorbidity) 6/6/2018     Medications:   Prior to Admission medications    Medication Sig Start Date End Date Taking?  Authorizing Provider   loratadine (CLARITIN) 10 MG tablet Take 10 mg by mouth daily   Yes Historical Provider, MD   Esomeprazole Magnesium (NEXIUM PO) Take 40 mg by mouth daily   Yes Historical Provider, MD   metoprolol succinate (TOPROL XL) 25 MG extended release tablet Take 25 mg by mouth daily   Yes Historical Provider, MD   acetaminophen (TYLENOL) 500 MG tablet Take by mouth every 6 hours as needed   Yes Ar Automatic Reconciliation   ergocalciferol (ERGOCALCIFEROL) 1.25 MG (51124 UT) capsule Take 50,000 Units by mouth every 7 days 3/17/22  Yes Ar Automatic Reconciliation   fluticasone (FLONASE) 50 MCG/ACT nasal spray 2 sprays by Nasal route daily   Yes Ar Automatic Reconciliation   metFORMIN (GLUCOPHAGE-XR) 500 MG extended release tablet Take 500 mg by mouth Daily with supper 3/17/22  Yes Ar Automatic Reconciliation   montelukast (SINGULAIR) 10 MG tablet Take 10 mg by mouth daily 2/25/22  Yes Ar Automatic Reconciliation        ROS:  All other pertinent ROS are negative. See HPI for pertinent ROS    Vital Signs  /68   Pulse 77   Temp 98.2 °F (36.8 °C) (Temporal)   Wt 207 lb (93.9 kg)   SpO2 97%   BMI 40.43 kg/m²   Body mass index is 40.43 kg/m². Physical Exam  Vitals and nursing note reviewed. Constitutional:       General: She is not in acute distress. Appearance: She is normal weight. She is not ill-appearing. HENT:      Head: Normocephalic and atraumatic. Eyes:      Extraocular Movements: Extraocular movements intact. Conjunctiva/sclera: Conjunctivae normal.   Musculoskeletal:         General: No deformity. Skin:     General: Skin is warm. Neurological:      General: No focal deficit present. Mental Status: She is alert and oriented to person, place, and time. Mental status is at baseline. Psychiatric:         Mood and Affect: Mood normal.         Behavior: Behavior normal.         PMH, PSH, SHx, FHx, Allergies, Medications reviewed and updated as indicated    Assessment/Plan:  Ambar Ibarra was seen today for follow-up. Diagnoses and all orders for this visit:    Chest pain on breathing  SOB (shortness of breath)  See HPI for full details. Patient overall doing much better. Recovering from viral infection. Still has some vague chest discomfort although significantly improved. Has been followed by cardiology. Has a scheduled stress echo. Continue Toprol-XL    Prediabetes  Patient to continue working on lifestyle changes including diet and exercise. Lab was closed at the time of her visit. She will get an A1c at her on office and send the results over. Continue metformin 500 XR. Cervical cancer screening  OBGYN: Pap smear with Dr. Chris Perez earlier this year (Feb 2022). Also had a mammogram November 2021.      Preventative health care  We will plan to get HIV and hep C screening at follow-up visit         30 minutes was spent on the day of this encounter including face to face time with patient, chart review, reviewing pt hx, ordering meds/tests/procedures, documenting, and/or interpreting results.      __  Katerin Grace M.D.

## 2022-06-09 ENCOUNTER — OFFICE VISIT (OUTPATIENT)
Dept: CARDIOLOGY CLINIC | Age: 47
End: 2022-06-09

## 2022-06-09 ENCOUNTER — OFFICE VISIT (OUTPATIENT)
Dept: INTERNAL MEDICINE CLINIC | Facility: CLINIC | Age: 47
End: 2022-06-09
Payer: COMMERCIAL

## 2022-06-09 VITALS
BODY MASS INDEX: 40.84 KG/M2 | HEART RATE: 82 BPM | WEIGHT: 208 LBS | DIASTOLIC BLOOD PRESSURE: 70 MMHG | SYSTOLIC BLOOD PRESSURE: 106 MMHG | HEIGHT: 60 IN

## 2022-06-09 VITALS
WEIGHT: 207 LBS | BODY MASS INDEX: 40.43 KG/M2 | HEART RATE: 77 BPM | OXYGEN SATURATION: 97 % | SYSTOLIC BLOOD PRESSURE: 120 MMHG | TEMPERATURE: 98.2 F | DIASTOLIC BLOOD PRESSURE: 68 MMHG

## 2022-06-09 DIAGNOSIS — Z12.4 CERVICAL CANCER SCREENING: ICD-10-CM

## 2022-06-09 DIAGNOSIS — R73.03 PREDIABETES: ICD-10-CM

## 2022-06-09 DIAGNOSIS — Z00.00 PREVENTATIVE HEALTH CARE: ICD-10-CM

## 2022-06-09 DIAGNOSIS — R06.02 SOB (SHORTNESS OF BREATH): Primary | ICD-10-CM

## 2022-06-09 DIAGNOSIS — E78.00 PURE HYPERCHOLESTEROLEMIA: ICD-10-CM

## 2022-06-09 DIAGNOSIS — R07.1 CHEST PAIN ON BREATHING: Primary | ICD-10-CM

## 2022-06-09 DIAGNOSIS — B34.9 ACUTE VIRAL DISEASE: ICD-10-CM

## 2022-06-09 DIAGNOSIS — R06.02 SOB (SHORTNESS OF BREATH): ICD-10-CM

## 2022-06-09 PROBLEM — R07.89 OTHER CHEST PAIN: Status: ACTIVE | Noted: 2022-05-04

## 2022-06-09 PROCEDURE — 99214 OFFICE O/P EST MOD 30 MIN: CPT | Performed by: FAMILY MEDICINE

## 2022-06-09 RX ORDER — METOPROLOL SUCCINATE 25 MG/1
25 TABLET, EXTENDED RELEASE ORAL DAILY
COMMUNITY
End: 2022-08-08 | Stop reason: SDUPTHER

## 2022-06-09 RX ORDER — LORATADINE 10 MG/1
10 TABLET ORAL DAILY
COMMUNITY
End: 2022-08-08 | Stop reason: SDUPTHER

## 2022-06-09 ASSESSMENT — PATIENT HEALTH QUESTIONNAIRE - PHQ9
SUM OF ALL RESPONSES TO PHQ QUESTIONS 1-9: 0
SUM OF ALL RESPONSES TO PHQ9 QUESTIONS 1 & 2: 0
SUM OF ALL RESPONSES TO PHQ9 QUESTIONS 1 & 2: 0
SUM OF ALL RESPONSES TO PHQ QUESTIONS 1-9: 0
1. LITTLE INTEREST OR PLEASURE IN DOING THINGS: 0
1. LITTLE INTEREST OR PLEASURE IN DOING THINGS: 0
2. FEELING DOWN, DEPRESSED OR HOPELESS: 0
SUM OF ALL RESPONSES TO PHQ QUESTIONS 1-9: 0
SUM OF ALL RESPONSES TO PHQ QUESTIONS 1-9: 0
2. FEELING DOWN, DEPRESSED OR HOPELESS: 0
SUM OF ALL RESPONSES TO PHQ QUESTIONS 1-9: 0
SUM OF ALL RESPONSES TO PHQ QUESTIONS 1-9: 0

## 2022-06-09 ASSESSMENT — ANXIETY QUESTIONNAIRES
7. FEELING AFRAID AS IF SOMETHING AWFUL MIGHT HAPPEN: 0
3. WORRYING TOO MUCH ABOUT DIFFERENT THINGS: 0
6. BECOMING EASILY ANNOYED OR IRRITABLE: 0
4. TROUBLE RELAXING: 0
5. BEING SO RESTLESS THAT IT IS HARD TO SIT STILL: 0
GAD7 TOTAL SCORE: 0
IF YOU CHECKED OFF ANY PROBLEMS ON THIS QUESTIONNAIRE, HOW DIFFICULT HAVE THESE PROBLEMS MADE IT FOR YOU TO DO YOUR WORK, TAKE CARE OF THINGS AT HOME, OR GET ALONG WITH OTHER PEOPLE: NOT DIFFICULT AT ALL
1. FEELING NERVOUS, ANXIOUS, OR ON EDGE: 0
2. NOT BEING ABLE TO STOP OR CONTROL WORRYING: 0

## 2022-06-09 ASSESSMENT — ENCOUNTER SYMPTOMS
COUGH: 0
SHORTNESS OF BREATH: 0

## 2022-06-09 NOTE — PROGRESS NOTES
[address], [address] [facility], [address] [place] [address]  PHONE: [phone]    Carlota Foley [address]  1975      SUBJECTIVE:   Jennifer Quesada is a 40 y.o. 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 She also has some vague discomfort across her chest.  She got evaluated with negative troponins. She had a stress test a year ago which was negative. She feels better since I saw her last.  Her echo showed normal function. She still gets a little filmy feeling across her chest when she is worse but she thinks is gotten better. She is working but has not been doing her elliptical and she is little nervous about doing it. He has not lost any significant weight      Past Medical History, Past Surgical History, Family history, Social History, and Medications were all reviewed with the patient today and updated as necessary.        Allergies   Allergen Reactions    Butorphanol Other (See Comments)     dizzy     Past Medical History:   Diagnosis Date    Allergic rhinitis 1/3/2017    Anxiety     no meds at present    Chronic pain     back     DDD (degenerative disc disease), cervical     C5-6    Fatty liver 2018    GERD (gastroesophageal reflux disease)     controlled with med    H/O seasonal allergies     Helicobacter pylori ab+ 339    Helicobacter pylori ab+ 5/3/2018    Hyperlipidemia 10/3/2019    Nausea & vomiting     WITH BOTH C- SECT    Obesity (BMI 35.0-39.9 without comorbidity) 2018     Past Surgical History:   Procedure Laterality Date     SECTION      x 2    NEUROLOGICAL SURGERY      [place]    OTHER SURGICAL HISTORY      root canal, tooth extractions/abscess    UPPER GASTROINTESTINAL ENDOSCOPY  2018    EGD - gastric bezoar, mild gastriits, hiatal hernia     Family History   Problem Relation Age of Onset    Diabetes Maternal Grandmother     Dementia Maternal Grandmother     Colon Cancer Paternal Grandfather         colon    Heart Disease Paternal Grandmother     Cancer Paternal Aunt         Breast    Breast Cancer Paternal Aunt         46s   [de-identified] Elevated Lipids Mother     Elevated Lipids Father     Hypertension Father     Diabetes Paternal Grandmother       Social History     Tobacco Use    Smoking status: Never Smoker    Smokeless tobacco: Never Used   Substance Use Topics    Alcohol use: Yes       ROS:    Review of Systems   Constitutional: Positive for weight gain. Cardiovascular: Positive for chest pain. Negative for irregular heartbeat. Respiratory: Negative for cough and shortness of breath. PHYSICAL EXAM:    /70   Pulse 82   Ht 5' (1.524 m)   Wt 208 lb (94.3 kg) Comment: with shoes  BMI 40.62 kg/m²        Wt Readings from Last 3 Encounters:   06/09/22 208 lb (94.3 kg)   05/13/22 207 lb (93.9 kg)   05/05/22 209 lb (94.8 kg)     BP Readings from Last 3 Encounters:   06/09/22 106/70   05/13/22 102/68   05/05/22 130/65         Physical Exam  Constitutional:       General: She is not in acute distress. Cardiovascular:      Rate and Rhythm: Normal rate and regular rhythm. Heart sounds: No murmur heard. No friction rub. Musculoskeletal:         General: No swelling. Neurological:      Mental Status: She is alert. Medical problems and test results were reviewed with the patient today. No results found for any visits on 06/09/22. Lab Results   Component Value Date     04/30/2022    K 3.7 04/30/2022     04/30/2022    CO2 28 04/30/2022    BUN 11 04/30/2022    GFRAA >60 04/30/2022     Lab Results   Component Value Date    CHOL 230 03/09/2022    HDL 59 03/09/2022    VLDL 14 03/09/2022         ASSESSMENT and PLAN    Catherine was seen today for shortness of breath.     Diagnoses and all orders for this visit:  8  SOB (shortness of breath) overall shortness of breath is improved. -     Stress echocardiogram (TTE) exercise with contrast, bubble, strain, and 3D PRN order panel; Future    Pure hypercholesterolemia she does have high cholesterol and other risk factors  -     Stress echocardiogram (TTE) exercise with contrast, bubble, strain, and 3D PRN order panel; Future    Acute viral disease comes in with a viral illness with all the symptoms began. She is slowly gotten better but she still has some vague discomfort  -     Stress echocardiogram (TTE) exercise with contrast, bubble, strain, and 3D PRN order panel; Future  Chest discomfort she still gets is little vague discomfort which she does think she thinks is better she is nervous about getting on an elliptical.  I will have her come back to do a stress echo      [unfilled]      No follow-up provider specified.     Phoebe Schuler MD  6/9/2022  10:39 AM

## 2022-07-23 ENCOUNTER — TELEPHONE (OUTPATIENT)
Dept: INTERNAL MEDICINE CLINIC | Facility: CLINIC | Age: 47
End: 2022-07-23

## 2022-07-23 DIAGNOSIS — U07.1 LAB TEST POSITIVE FOR DETECTION OF COVID-19 VIRUS: Primary | ICD-10-CM

## 2022-08-01 ENCOUNTER — TELEPHONE (OUTPATIENT)
Dept: CARDIOLOGY CLINIC | Age: 47
End: 2022-08-01

## 2022-08-08 DIAGNOSIS — R73.03 PREDIABETES: Primary | ICD-10-CM

## 2022-08-08 RX ORDER — MONTELUKAST SODIUM 10 MG/1
10 TABLET ORAL DAILY
Qty: 90 TABLET | Refills: 2 | Status: SHIPPED | OUTPATIENT
Start: 2022-08-08

## 2022-08-08 RX ORDER — METFORMIN HYDROCHLORIDE 500 MG/1
500 TABLET, EXTENDED RELEASE ORAL
Qty: 90 TABLET | Refills: 2 | Status: SHIPPED | OUTPATIENT
Start: 2022-08-08

## 2022-08-08 RX ORDER — ESOMEPRAZOLE MAGNESIUM 40 MG/1
40 CAPSULE, DELAYED RELEASE ORAL DAILY
Qty: 90 CAPSULE | Refills: 2 | Status: SHIPPED | OUTPATIENT
Start: 2022-08-08

## 2022-08-08 RX ORDER — LORATADINE 10 MG/1
10 TABLET ORAL DAILY
Qty: 90 TABLET | Refills: 2 | Status: SHIPPED | OUTPATIENT
Start: 2022-08-08

## 2022-08-08 RX ORDER — METOPROLOL SUCCINATE 25 MG/1
25 TABLET, EXTENDED RELEASE ORAL DAILY
Qty: 90 TABLET | Refills: 2 | Status: SHIPPED | OUTPATIENT
Start: 2022-08-08

## 2022-08-25 ENCOUNTER — OFFICE VISIT (OUTPATIENT)
Dept: CARDIOLOGY CLINIC | Age: 47
End: 2022-08-25

## 2022-08-25 VITALS
BODY MASS INDEX: 41.15 KG/M2 | SYSTOLIC BLOOD PRESSURE: 132 MMHG | WEIGHT: 209.6 LBS | HEIGHT: 60 IN | HEART RATE: 76 BPM | DIASTOLIC BLOOD PRESSURE: 70 MMHG

## 2022-08-25 DIAGNOSIS — R06.02 SOB (SHORTNESS OF BREATH): Primary | ICD-10-CM

## 2022-08-25 DIAGNOSIS — R07.89 OTHER CHEST PAIN: ICD-10-CM

## 2022-08-25 NOTE — PROGRESS NOTES
[address], [address] [facility], [address] [place] [address]  PHONE: [phone]    Catherine Iniguez  1975      SUBJECTIVE:   Lela Sutton is a 40 y.o. 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  She had some type of viral illness and had shortness of breath and chest discomfort echo showed normal function she had a prior stress test a few years ago but on my partners is okay. We brought her back and did a nuclear stress study she went stage III her heart rate 100% of maximum level she had no symptoms at all. She had some mild breast attenuation but normal perfusion overall with no ischemia and normal ejection fraction. Overall she is been feeling better and stronger without any recent issues      Past Medical History, Past Surgical History, Family history, Social History, and Medications were all reviewed with the patient today and updated as necessary.        Allergies   Allergen Reactions    Butorphanol Other (See Comments)     dizzy     Past Medical History:   Diagnosis Date    Allergic rhinitis 1/3/2017    Anxiety     no meds at present    Chronic pain     back     DDD (degenerative disc disease), cervical     C5-6    Fatty liver 2018    GERD (gastroesophageal reflux disease)     controlled with med    H/O seasonal allergies     Helicobacter pylori ab+     Helicobacter pylori ab+ 5/3/2018    Hyperlipidemia 10/3/2019    Nausea & vomiting     WITH BOTH C- SECT    Obesity (BMI 35.0-39.9 without comorbidity) 2018     Past Surgical History:   Procedure Laterality Date     SECTION      x 2    NEUROLOGICAL SURGERY      [place]    OTHER SURGICAL HISTORY      root canal, tooth extractions/abscess    UPPER GASTROINTESTINAL ENDOSCOPY  2018    EGD - gastric bezoar, mild gastriits, hiatal hernia     Family History   Problem Relation Age of Onset    Diabetes Maternal Grandmother     Dementia Maternal Grandmother     Colon Cancer Paternal Grandfather         colon    Heart Disease Paternal Grandmother     Cancer Paternal Aunt         Breast    Breast Cancer Paternal Aunt         46s    Elevated Lipids Mother     Elevated Lipids Father     Hypertension Father     Diabetes Paternal Grandmother       Social History     Tobacco Use    Smoking status: Never    Smokeless tobacco: Never   Substance Use Topics    Alcohol use: Yes       ROS:    Review of Systems   Constitutional: Negative for weight loss. Cardiovascular:  Negative for chest pain and leg swelling. PHYSICAL EXAM:    /70   Pulse 76   Ht 5' (1.524 m)   Wt 209 lb 9.6 oz (95.1 kg)   BMI 40.93 kg/m²        Wt Readings from Last 3 Encounters:   08/25/22 209 lb 9.6 oz (95.1 kg)   08/11/22 207 lb (93.9 kg)   06/09/22 207 lb (93.9 kg)     BP Readings from Last 3 Encounters:   08/25/22 132/70   08/11/22 120/76   06/09/22 120/68         Physical Exam  Constitutional:       General: She is not in acute distress. Cardiovascular:      Rate and Rhythm: Normal rate. Neurological:      Mental Status: She is alert. Medical problems and test results were reviewed with the patient today. No results found for any visits on 08/25/22. Lab Results   Component Value Date/Time     04/30/2022 06:11 PM    K 3.7 04/30/2022 06:11 PM     04/30/2022 06:11 PM    CO2 28 04/30/2022 06:11 PM    BUN 11 04/30/2022 06:11 PM    GFRAA >60 04/30/2022 06:11 PM     Lab Results   Component Value Date/Time    CHOL 230 03/09/2022 08:00 AM    HDL 59 03/09/2022 08:00 AM    VLDL 14 03/09/2022 08:00 AM         ASSESSMENT and PLAN    Catherine was seen today for results and shortness of breath.     Diagnoses and all orders for this visit:    SOB (shortness of breath) this seems to be of gotten better since she had some sort of viral illness and slowly improved and she is currently stable. Other chest pain seems to be much better at this time we did not induce any chest pain and maximal heart rate level and the stress test overall imaging was normal normal ejection fraction. I went through this with her again I do not think we need any further test at this point      [unfilled]      No follow-up provider specified.     Laverne Valero MD  8/25/2022  11:15 AM

## 2022-10-05 ENCOUNTER — TELEPHONE (OUTPATIENT)
Dept: INTERNAL MEDICINE CLINIC | Facility: CLINIC | Age: 47
End: 2022-10-05

## 2022-10-05 DIAGNOSIS — J30.1 SEASONAL ALLERGIC RHINITIS DUE TO POLLEN: Primary | ICD-10-CM

## 2022-10-05 DIAGNOSIS — J00 ACUTE NASOPHARYNGITIS: ICD-10-CM

## 2022-10-05 RX ORDER — AZITHROMYCIN 250 MG/1
250 TABLET, FILM COATED ORAL SEE ADMIN INSTRUCTIONS
Qty: 6 TABLET | Refills: 0 | Status: SHIPPED | OUTPATIENT
Start: 2022-10-05 | End: 2022-10-10 | Stop reason: ALTCHOICE

## 2022-10-05 RX ORDER — ALBUTEROL SULFATE 90 UG/1
2 AEROSOL, METERED RESPIRATORY (INHALATION) 4 TIMES DAILY PRN
Qty: 54 G | Refills: 1 | Status: SHIPPED | OUTPATIENT
Start: 2022-10-05

## 2022-10-10 ENCOUNTER — OFFICE VISIT (OUTPATIENT)
Dept: INTERNAL MEDICINE CLINIC | Facility: CLINIC | Age: 47
End: 2022-10-10
Payer: COMMERCIAL

## 2022-10-10 ENCOUNTER — HOSPITAL ENCOUNTER (OUTPATIENT)
Dept: GENERAL RADIOLOGY | Age: 47
Discharge: HOME | End: 2022-10-13
Payer: COMMERCIAL

## 2022-10-10 VITALS
SYSTOLIC BLOOD PRESSURE: 119 MMHG | DIASTOLIC BLOOD PRESSURE: 64 MMHG | HEIGHT: 60 IN | BODY MASS INDEX: 40.64 KG/M2 | OXYGEN SATURATION: 98 % | WEIGHT: 207 LBS | TEMPERATURE: 98.3 F | HEART RATE: 86 BPM

## 2022-10-10 DIAGNOSIS — R09.89 UPPER RESPIRATORY SYMPTOM: Primary | ICD-10-CM

## 2022-10-10 DIAGNOSIS — R09.89 UPPER RESPIRATORY SYMPTOM: ICD-10-CM

## 2022-10-10 DIAGNOSIS — R53.83 OTHER FATIGUE: ICD-10-CM

## 2022-10-10 DIAGNOSIS — R06.9 RESPIRATION ABNORMAL: ICD-10-CM

## 2022-10-10 DIAGNOSIS — R07.81 RIB PAIN: ICD-10-CM

## 2022-10-10 LAB
EXP DATE SOLUTION: NORMAL
EXP DATE SWAB: NORMAL
INFLUENZA A ANTIGEN, POC: NEGATIVE
INFLUENZA B ANTIGEN, POC: NEGATIVE
LOT NUMBER SOLUTION: NORMAL
LOT NUMBER SWAB: NORMAL
PROCALCITONIN SERPL-MCNC: <0.05 NG/ML (ref 0–0.49)
SARS-COV-2 RNA, POC: NEGATIVE
VALID INTERNAL CONTROL, POC: YES

## 2022-10-10 PROCEDURE — 99214 OFFICE O/P EST MOD 30 MIN: CPT | Performed by: FAMILY MEDICINE

## 2022-10-10 PROCEDURE — 71046 X-RAY EXAM CHEST 2 VIEWS: CPT

## 2022-10-10 PROCEDURE — 87635 SARS-COV-2 COVID-19 AMP PRB: CPT | Performed by: FAMILY MEDICINE

## 2022-10-10 PROCEDURE — 87804 INFLUENZA ASSAY W/OPTIC: CPT | Performed by: FAMILY MEDICINE

## 2022-10-10 RX ORDER — METHYLPREDNISOLONE 4 MG/1
4 TABLET ORAL SEE ADMIN INSTRUCTIONS
Qty: 21 TABLET | Refills: 0 | Status: SHIPPED | OUTPATIENT
Start: 2022-10-10

## 2022-10-10 NOTE — PROGRESS NOTES
Baldomero Medeiros M.D. Family and Sports Medicine  [address], [address]  Office : [phone]  Fax : [phone]    Chief Complaint   Patient presents with    Chest Pain     The pt is in today for chest tightness/rib pain, fatigue, and feeling winded when she walks or climbs stairs. . The pts rib pain is not related to an injury. She states she began having body aches, chills, and chest tightness last Thursday. She tested 2xs for COVID and was negative. She called in a Zpak and a Qvar inhaler. She used it for couple of days, but it did not help much. She denies cough, sore throat, or fever. She notes today she is feeling some better, but is still not 100% yet. History of Present Illness:  Gabino Jenkins is a 40 y.o. female. HPI  46-year-old female presents as a same-day appointment with complaints of fatigue and rib pain. Of note, patient was last seen on 6/9/2022 for follow-up of complaints of chest pain and shortness of breath. Status post severe COVID-19 infection earlier in the year. Believe that her symptoms are likely in part due to costochondritis. She was seen by cardiology. She was given Toprol in addition to steroids. Her echo was overall unremarkable. The vague discomfort in her chest was resolving. She was working on increasing activity. Plan on doing a stress echo. Last seen by cardiology on 8/25/2022. Check completed nuclear stress test without any exacerbation of symptoms. She was feeling much better at that time. It was believed that her symptoms were due to a recent viral infection and no further work-up was needed    Symptoms for started Thursday. She endorsed myalgias, chills, chest tightness. 2 negative COVID-19 tests. She is taking Qvar as well as a Z-Teddy with some improvement. Denies any fever cough.   She is doing some better over the past few days although still symptomatic      Past Medical History:  Past Medical History:   Diagnosis Date    Allergic rhinitis 1/3/2017    Anxiety     no meds at present    Chronic pain     back     DDD (degenerative disc disease), cervical     C5-6    Fatty liver 6/6/2018    GERD (gastroesophageal reflux disease)     controlled with med    H/O seasonal allergies     Helicobacter pylori ab+ 8/2/2682    Helicobacter pylori ab+ 5/3/2018    Hyperlipidemia 10/3/2019    Nausea & vomiting     WITH BOTH C- SECT    Obesity (BMI 35.0-39.9 without comorbidity) 6/6/2018     Medications:   Prior to Admission medications    Medication Sig Start Date End Date Taking? Authorizing Provider   methylPREDNISolone (MEDROL DOSEPACK) 4 MG tablet Take 1 tablet by mouth See Admin Instructions Take by mouth. 10/10/22  Yes Ana Kent MD   vitamin D 25 MCG (1000 UT) CAPS Take by mouth daily   Yes Historical Provider, MD   esomeprazole (NEXIUM) 40 MG delayed release capsule Take 1 capsule by mouth in the morning. 8/8/22  Yes Ana Kent MD   montelukast (SINGULAIR) 10 MG tablet Take 1 tablet by mouth in the morning. 8/8/22  Yes Ana Kent MD   metoprolol succinate (TOPROL XL) 25 MG extended release tablet Take 1 tablet by mouth in the morning. 8/8/22  Yes Ana Kent MD   metFORMIN (GLUCOPHAGE-XR) 500 MG extended release tablet Take 1 tablet by mouth Daily with supper 8/8/22  Yes Ana Kent MD   loratadine (CLARITIN) 10 MG tablet Take 1 tablet by mouth in the morning. 8/8/22  Yes Ana Kent MD   acetaminophen (TYLENOL) 500 MG tablet Take by mouth every 6 hours as needed   Yes Ar Automatic Reconciliation   fluticasone (FLONASE) 50 MCG/ACT nasal spray 2 sprays by Nasal route daily   Yes Ar Automatic Reconciliation   beclomethasone (QVAR REDIHALER) 40 MCG/ACT AERB inhaler Inhale 1 puff into the lungs in the morning and 1 puff in the evening.   Patient not taking: Reported on 10/10/2022 10/5/22 11/4/22  Dante Arriaga MD   albuterol sulfate HFA (VENTOLIN HFA) 108 (90 Base) MCG/ACT inhaler Inhale 2 puffs into the lungs 4 times daily as needed for Wheezing  Patient not taking: Reported on 10/10/2022 10/5/22   Dante Arriaga MD        ROS:  All other pertinent ROS are negative. See HPI for pertinent ROS    Vital Signs  /64 (Site: Right Upper Arm, Position: Sitting, Cuff Size: Large Adult)   Pulse 86   Temp 98.3 °F (36.8 °C) (Temporal)   Ht 5' (1.524 m)   Wt 207 lb (93.9 kg)   SpO2 98%   BMI 40.43 kg/m²   Body mass index is 40.43 kg/m². Physical Exam  Vitals and nursing note reviewed. Constitutional:       General: She is not in acute distress. Appearance: She is normal weight. She is not ill-appearing, toxic-appearing or diaphoretic. HENT:      Head: Normocephalic and atraumatic. Eyes:      Extraocular Movements: Extraocular movements intact. Conjunctiva/sclera: Conjunctivae normal.      Pupils: Pupils are equal, round, and reactive to light. Cardiovascular:      Rate and Rhythm: Normal rate and regular rhythm. Pulses: Normal pulses. Heart sounds: Normal heart sounds. Pulmonary:      Effort: Pulmonary effort is normal.      Breath sounds: Normal breath sounds. Musculoskeletal:         General: No deformity. Comments: Diffuse tenderness along the lateral and posterior intercostal spaces in addition to bilateral tenderness along sternal border   Skin:     General: Skin is warm. Neurological:      General: No focal deficit present. Mental Status: She is alert and oriented to person, place, and time. Mental status is at baseline. Psychiatric:         Mood and Affect: Mood normal.         Behavior: Behavior normal.       PMH, PSH, SHx, FHx, Allergies, Medications reviewed and updated as indicated    Assessment/Plan:  Alysia Phillips was seen today for chest pain.     Diagnoses and all orders for this visit:    Upper respiratory symptom  -     AMB POC COVID-19 COV  -     AMB POC RAPID INFLUENZA TEST  -     Cancel: CBC with Auto Differential; Future  -     Procalcitonin; Future  -     AMB POC XRAY CHEST 2 VIEWS  -     methylPREDNISolone (MEDROL DOSEPACK) 4 MG tablet; Take 1 tablet by mouth See Admin Instructions Take by mouth. Rib pain  -     AMB POC COVID-19 COV  -     AMB POC RAPID INFLUENZA TEST  -     methylPREDNISolone (MEDROL DOSEPACK) 4 MG tablet; Take 1 tablet by mouth See Admin Instructions Take by mouth. Other fatigue  -     AMB POC COVID-19 COV  -     AMB POC RAPID INFLUENZA TEST     46-year-old female presents with 5 days of generalized myalgias, fatigue, shortness of breath. Slight improvement over the past several days. O2 stats have been stable. Vitals have been stable. Negative COVID-19, flu test.  CBC and Pro-Sharath unremarkable. Chest x-ray unremarkable. Most consistent with likely viral upper respiratory infection. Given patient's recent severity with COVID-19 infection, high precaution and close monitoring warranted. Medrol Dosepak supplied. Patient to call back with any continued or worsening symptoms for the next several days. Return if symptoms worsen or fail to improve. 34 minutes was spent on the day of this encounter including face to face time with patient, chart review, reviewing pt hx, ordering meds/tests/procedures, documenting, and/or interpreting results.      __  Ambrosio Baires M.D.

## 2022-10-11 ENCOUNTER — NURSE TRIAGE (OUTPATIENT)
Dept: OTHER | Facility: CLINIC | Age: 47
End: 2022-10-11

## 2022-10-11 ENCOUNTER — TELEPHONE (OUTPATIENT)
Dept: INTERNAL MEDICINE CLINIC | Facility: CLINIC | Age: 47
End: 2022-10-11

## 2022-10-11 DIAGNOSIS — R10.9 ACUTE ABDOMINAL PAIN: Primary | ICD-10-CM

## 2022-10-11 LAB
BASOPHILS # BLD: 0 K/UL (ref 0–0.2)
BASOPHILS NFR BLD: 0 % (ref 0–2)
DIFFERENTIAL METHOD BLD: ABNORMAL
EOSINOPHIL # BLD: 0 K/UL (ref 0–0.8)
EOSINOPHIL NFR BLD: 0 % (ref 0.5–7.8)
ERYTHROCYTE [DISTWIDTH] IN BLOOD BY AUTOMATED COUNT: 12.3 % (ref 11.9–14.6)
HCT VFR BLD AUTO: 39.1 % (ref 35.8–46.3)
HGB BLD-MCNC: 13.4 G/DL (ref 11.7–15.4)
IMM GRANULOCYTES # BLD AUTO: 0 K/UL (ref 0–0.5)
IMM GRANULOCYTES NFR BLD AUTO: 0 % (ref 0–5)
LYMPHOCYTES # BLD: 1.5 K/UL (ref 0.5–4.6)
LYMPHOCYTES NFR BLD: 22 % (ref 13–44)
MCH RBC QN AUTO: 31.6 PG (ref 26.1–32.9)
MCHC RBC AUTO-ENTMCNC: 34.3 G/DL (ref 31.4–35)
MCV RBC AUTO: 92.2 FL (ref 82–102)
MONOCYTES # BLD: 0.7 K/UL (ref 0.1–1.3)
MONOCYTES NFR BLD: 10 % (ref 4–12)
NEUTS SEG # BLD: 4.5 K/UL (ref 1.7–8.2)
NEUTS SEG NFR BLD: 68 % (ref 43–78)
NRBC # BLD: 0 K/UL (ref 0–0.2)
PLATELET # BLD AUTO: 388 K/UL (ref 150–450)
PMV BLD AUTO: 9.7 FL (ref 9.4–12.3)
RBC # BLD AUTO: 4.24 M/UL (ref 4.05–5.2)
WBC # BLD AUTO: 6.7 K/UL (ref 4.3–11.1)

## 2022-10-11 NOTE — TELEPHONE ENCOUNTER
Spoke with patient on the phone. Overall stable but is concerned about worsening diffuse abdominal pain and wants to rule out any significant intra-abdominal pathology. Will obtain CT abdomen. Patient also endorses that her sats dropped down to 94% temporarily but have improved since then. Wants to hold on any additional pain medication at this time. Precautions advised.

## 2022-10-11 NOTE — TELEPHONE ENCOUNTER
Location of patient: [place]    Received call from Kelly at [facility] with [employer]. Subjective: Caller states \"Rib pain\"     Current Symptoms: Rib pain; Upper abdominal pain  Chills; Saw MD yesterday; Was put on z-pack and had lab and an x-ray; Was told office to call back if not better; Onset: 1 week ago; sudden    Associated Symptoms: reduced activity    Pain Severity: 5/10; aching; constant    Temperature: denies     What has been tried: Tylenol and Ibuprofen    LMP:  1 week ago  Pregnant: No    Recommended disposition: See in Office Today  Attempted to call office X3 and was put on hold immediately each time;    Care advice provided, patient verbalizes understanding; denies any other questions or concerns; instructed to call back for any new or worsening symptoms. Patient/Caller agrees with recommended disposition; writer provided warm transfer to Reed Rangel at [facility] for appointment scheduling    Attention Provider: Thank you for allowing me to participate in the care of your patient. The patient was connected to triage in response to information provided to the ECC/PSC. Please do not respond through this encounter as the response is not directed to a shared pool.       Reason for Disposition   MODERATE pain (e.g., interferes with normal activities that comes and goes (cramps) lasts > 24 hours  (Exception: Pain with Vomiting or Diarrhea - see that Protocol.)    Protocols used: Abdominal Pain - Female-ADULT-[place]

## 2022-10-12 ENCOUNTER — HOSPITAL ENCOUNTER (OUTPATIENT)
Dept: CT IMAGING | Age: 47
Discharge: HOME | End: 2022-10-15

## 2022-10-12 DIAGNOSIS — R10.9 ACUTE ABDOMINAL PAIN: ICD-10-CM

## 2022-11-09 NOTE — PROGRESS NOTES
Eder Goncalves M.D. Family and Sports Medicine  [address] [place] [address], [facility] [address] [place] [address]  Office : [phone]  Fax : [phone]    Chief Complaint   Patient presents with    Chest Pain     The pt notes she was seen a few weeks ago for sternum pain that radiates down and around into B rib cages. She notes as the day progresses it intensifies. She is unsure what brings it on. She does not lift anything heavy. The pt notes the pain is constant, with varying intensity. She notes she is the best in the morning after laying all night. She states once she is upright it begins to intensify. The pt notes the pain is a 9/10 on the pain scale, at it's worst, and is a 2/10 at this time. History of Present Illness:  Tatiana Andujar is a 56 y.o. female. HPI  55-year-old female presents for follow-up of generalized myalgias, fatigue, shortness of breath. History of severe COVID-19 infection earlier in the year. Also had viral infection this previous summer that resulted in prolonged symptoms. Seen by cardiologist.  Symptoms resolved and she is back to baseline. Seen on 10/10/2022 with complaints of generalized symptoms starting 5 days prior. At that time vitals and O2 stats were stable. She had a COVID 19 and flu test which were negative. CBC and Pro-Sharath were unremarkable. She had unremarkable chest x-ray. Suspected that she had a viral infection. She was given a dose pack. Precautions advised    Patient called on 10/11/2022 with complaints of worsening diffuse abdominal pain. CT abdomen was ordered which was unremarkable for any acute findings. Rib pain becomes intense at times. Still has a pressure sensation. Still has a constant pressure sensation in chest.  Pain felt along the sternum as well as the lower ribs. Feels best first thing in the morning. On nexium. No pain after eating. No worse with exerting herself.    Pain is reproducible to palpation  Denies any shortness of breath or cough      Past Medical History:  Past Medical History:   Diagnosis Date    Allergic rhinitis 1/3/2017    Anxiety     no meds at present    Chronic pain     back     DDD (degenerative disc disease), cervical     C5-6    Fatty liver 6/6/2018    GERD (gastroesophageal reflux disease)     controlled with med    H/O seasonal allergies     Helicobacter pylori ab+ 1/4/2439    Helicobacter pylori ab+ 5/3/2018    Hyperlipidemia 10/3/2019    Nausea & vomiting     WITH BOTH C- SECT    Obesity (BMI 35.0-39.9 without comorbidity) 6/6/2018     Medications:   Prior to Admission medications    Medication Sig Start Date End Date Taking? Authorizing Provider   predniSONE (DELTASONE) 10 MG tablet Take 4 tablets by mouth daily for 2 days, THEN 2 tablets daily for 5 days, THEN 1 tablet daily for 5 days, THEN 0.5 tablets daily for 5 days. 11/10/22 11/27/22 Yes Fady Velez MD   albuterol sulfate HFA (VENTOLIN HFA) 108 (90 Base) MCG/ACT inhaler Inhale 2 puffs into the lungs 4 times daily as needed for Wheezing 10/5/22  Yes Dora Leyden, MD   vitamin D 25 MCG (1000 UT) CAPS Take by mouth daily   Yes Historical Provider, MD   esomeprazole (NEXIUM) 40 MG delayed release capsule Take 1 capsule by mouth in the morning. 8/8/22  Yes aFdy Velez MD   montelukast (SINGULAIR) 10 MG tablet Take 1 tablet by mouth in the morning. 8/8/22  Yes Fady Velez MD   metoprolol succinate (TOPROL XL) 25 MG extended release tablet Take 1 tablet by mouth in the morning. 8/8/22  Yes Fady Velez MD   metFORMIN (GLUCOPHAGE-XR) 500 MG extended release tablet Take 1 tablet by mouth Daily with supper 8/8/22  Yes Fady Velez MD   loratadine (CLARITIN) 10 MG tablet Take 1 tablet by mouth in the morning.  8/8/22  Yes Fady Velez MD   acetaminophen (TYLENOL) 500 MG tablet Take by mouth every 6 hours as needed   Yes Ar Automatic Reconciliation   fluticasone (FLONASE) 50 MCG/ACT nasal spray 2 sprays by Nasal route daily   Yes Ar Automatic Reconciliation        ROS:  All other pertinent ROS are negative. See HPI for pertinent ROS    Vital Signs  /76 (Site: Right Upper Arm, Position: Sitting, Cuff Size: Large Adult)   Pulse 81   Temp 98.2 °F (36.8 °C) (Temporal)   Ht 5' (1.524 m)   Wt 205 lb 6.4 oz (93.2 kg)   SpO2 97%   BMI 40.11 kg/m²   Body mass index is 40.11 kg/m². Physical Exam  Vitals and nursing note reviewed. Constitutional:       General: She is not in acute distress. Appearance: She is normal weight. She is not ill-appearing, toxic-appearing or diaphoretic. HENT:      Head: Normocephalic and atraumatic. Eyes:      Extraocular Movements: Extraocular movements intact. Conjunctiva/sclera: Conjunctivae normal.      Pupils: Pupils are equal, round, and reactive to light. Cardiovascular:      Rate and Rhythm: Normal rate and regular rhythm. Pulses: Normal pulses. Heart sounds: Normal heart sounds. Pulmonary:      Effort: Pulmonary effort is normal.      Breath sounds: Normal breath sounds. Abdominal:      Comments: No epigastric, right upper quadrant, left upper quadrant tenderness to palpation   Musculoskeletal:         General: No deformity. Skin:     General: Skin is warm. Neurological:      General: No focal deficit present. Mental Status: She is alert and oriented to person, place, and time. Mental status is at baseline. Psychiatric:         Mood and Affect: Mood normal.         Behavior: Behavior normal.       PMH, PSH, SHx, FHx, Allergies, Medications reviewed and updated as indicated    Assessment/Plan:  Simeon Jacinto was seen today for chest pain. Diagnoses and all orders for this visit:    Costochondral chest pain  -     predniSONE (DELTASONE) 10 MG tablet;  Take 4 tablets by mouth daily for 2 days, THEN 2 tablets daily for 5 days, THEN 1 tablet daily for 5 days, THEN 0.5 tablets daily for 5 days. 78-year-old female presents for follow-up of continued intermittent sternal and rib pain that has been present for the past month. She had unremarkable chest x-ray, lab work, CT abdomen. She has had recent unremarkable CT chest, echocardiogram, stress test.  She denies any red flags. Recent viral symptoms in the past month. She has responded very well to anti-inflammatories but not getting relief otherwise. 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 Discussed options including management, further work-up. She is in agreement that given her reproducible tenderness, improvement with anti-inflammatories, and unremarkable work-up as well as previous work-up that we will treat with a gradual taper of prednisone. See prescription above. Discussed potential adverse effects. Discussed precautions. Patient also work on stretches and exercises. Continue to monitor. Return if symptoms worsen or fail to improve. 30 minutes was spent on the day of this encounter including face to face time with patient, chart review, reviewing pt hx, ordering meds/tests/procedures, documenting, and/or interpreting results.      __  Gelacio Chin M.D.

## 2022-11-10 ENCOUNTER — OFFICE VISIT (OUTPATIENT)
Dept: INTERNAL MEDICINE CLINIC | Facility: CLINIC | Age: 47
End: 2022-11-10
Payer: COMMERCIAL

## 2022-11-10 VITALS
OXYGEN SATURATION: 97 % | TEMPERATURE: 98.2 F | HEIGHT: 60 IN | BODY MASS INDEX: 40.33 KG/M2 | DIASTOLIC BLOOD PRESSURE: 76 MMHG | WEIGHT: 205.4 LBS | SYSTOLIC BLOOD PRESSURE: 121 MMHG | HEART RATE: 81 BPM

## 2022-11-10 DIAGNOSIS — R07.89 COSTOCHONDRAL CHEST PAIN: Primary | ICD-10-CM

## 2022-11-10 PROCEDURE — 99214 OFFICE O/P EST MOD 30 MIN: CPT | Performed by: FAMILY MEDICINE

## 2022-11-10 RX ORDER — PREDNISONE 10 MG/1
TABLET ORAL
Qty: 26 TABLET | Refills: 0 | Status: SHIPPED | OUTPATIENT
Start: 2022-11-10 | End: 2022-11-27

## 2023-01-06 RX ORDER — LORATADINE 10 MG/1
10 TABLET ORAL DAILY
Qty: 90 TABLET | Refills: 3 | Status: SHIPPED | OUTPATIENT
Start: 2023-01-06

## 2023-01-06 RX ORDER — ERGOCALCIFEROL 1.25 MG/1
50000 CAPSULE ORAL WEEKLY
Qty: 12 CAPSULE | Refills: 3 | Status: SHIPPED | OUTPATIENT
Start: 2023-01-06

## 2023-02-16 ENCOUNTER — OFFICE VISIT (OUTPATIENT)
Dept: INTERNAL MEDICINE CLINIC | Facility: CLINIC | Age: 48
End: 2023-02-16
Payer: COMMERCIAL

## 2023-02-16 VITALS
DIASTOLIC BLOOD PRESSURE: 86 MMHG | WEIGHT: 217.4 LBS | OXYGEN SATURATION: 97 % | SYSTOLIC BLOOD PRESSURE: 134 MMHG | BODY MASS INDEX: 42.68 KG/M2 | TEMPERATURE: 98.4 F | HEIGHT: 60 IN | HEART RATE: 92 BPM

## 2023-02-16 DIAGNOSIS — R73.03 PREDIABETES: ICD-10-CM

## 2023-02-16 DIAGNOSIS — G43.009 MIGRAINE WITHOUT AURA AND WITHOUT STATUS MIGRAINOSUS, NOT INTRACTABLE: ICD-10-CM

## 2023-02-16 DIAGNOSIS — E55.9 VITAMIN D DEFICIENCY: ICD-10-CM

## 2023-02-16 DIAGNOSIS — K21.9 GASTROESOPHAGEAL REFLUX DISEASE, UNSPECIFIED WHETHER ESOPHAGITIS PRESENT: ICD-10-CM

## 2023-02-16 DIAGNOSIS — I10 PRIMARY HYPERTENSION: ICD-10-CM

## 2023-02-16 DIAGNOSIS — Z76.89 ESTABLISHING CARE WITH NEW DOCTOR, ENCOUNTER FOR: Primary | ICD-10-CM

## 2023-02-16 DIAGNOSIS — J30.2 SEASONAL ALLERGIES: ICD-10-CM

## 2023-02-16 PROCEDURE — 99214 OFFICE O/P EST MOD 30 MIN: CPT | Performed by: INTERNAL MEDICINE

## 2023-02-16 PROCEDURE — 3075F SYST BP GE 130 - 139MM HG: CPT | Performed by: INTERNAL MEDICINE

## 2023-02-16 PROCEDURE — 3079F DIAST BP 80-89 MM HG: CPT | Performed by: INTERNAL MEDICINE

## 2023-02-16 RX ORDER — FLUTICASONE PROPIONATE 50 MCG
2 SPRAY, SUSPENSION (ML) NASAL DAILY
Qty: 16 G | Refills: 4 | Status: SHIPPED | OUTPATIENT
Start: 2023-02-16

## 2023-02-16 RX ORDER — ESOMEPRAZOLE MAGNESIUM 40 MG/1
40 CAPSULE, DELAYED RELEASE ORAL DAILY
Qty: 90 CAPSULE | Refills: 2 | Status: SHIPPED | OUTPATIENT
Start: 2023-02-16

## 2023-02-16 RX ORDER — METOPROLOL SUCCINATE 25 MG/1
25 TABLET, EXTENDED RELEASE ORAL DAILY
Qty: 90 TABLET | Refills: 2 | Status: SHIPPED | OUTPATIENT
Start: 2023-02-16

## 2023-02-16 RX ORDER — LORATADINE 10 MG/1
10 TABLET ORAL DAILY
Qty: 90 TABLET | Refills: 3 | Status: SHIPPED | OUTPATIENT
Start: 2023-02-16

## 2023-02-16 RX ORDER — METFORMIN HYDROCHLORIDE 500 MG/1
500 TABLET, EXTENDED RELEASE ORAL
Qty: 90 TABLET | Refills: 2 | Status: SHIPPED | OUTPATIENT
Start: 2023-02-16

## 2023-02-16 RX ORDER — MONTELUKAST SODIUM 10 MG/1
10 TABLET ORAL DAILY
Qty: 90 TABLET | Refills: 2 | Status: SHIPPED | OUTPATIENT
Start: 2023-02-16

## 2023-02-16 SDOH — ECONOMIC STABILITY: HOUSING INSECURITY
IN THE LAST 12 MONTHS, WAS THERE A TIME WHEN YOU DID NOT HAVE A STEADY PLACE TO SLEEP OR SLEPT IN A SHELTER (INCLUDING NOW)?: NO

## 2023-02-16 SDOH — ECONOMIC STABILITY: INCOME INSECURITY: HOW HARD IS IT FOR YOU TO PAY FOR THE VERY BASICS LIKE FOOD, HOUSING, MEDICAL CARE, AND HEATING?: NOT HARD AT ALL

## 2023-02-16 SDOH — ECONOMIC STABILITY: FOOD INSECURITY: WITHIN THE PAST 12 MONTHS, THE FOOD YOU BOUGHT JUST DIDN'T LAST AND YOU DIDN'T HAVE MONEY TO GET MORE.: NEVER TRUE

## 2023-02-16 SDOH — ECONOMIC STABILITY: FOOD INSECURITY: WITHIN THE PAST 12 MONTHS, YOU WORRIED THAT YOUR FOOD WOULD RUN OUT BEFORE YOU GOT MONEY TO BUY MORE.: NEVER TRUE

## 2023-02-16 ASSESSMENT — PATIENT HEALTH QUESTIONNAIRE - PHQ9
SUM OF ALL RESPONSES TO PHQ9 QUESTIONS 1 & 2: 0
1. LITTLE INTEREST OR PLEASURE IN DOING THINGS: 0
SUM OF ALL RESPONSES TO PHQ QUESTIONS 1-9: 0
2. FEELING DOWN, DEPRESSED OR HOPELESS: 0

## 2023-02-16 NOTE — ASSESSMENT & PLAN NOTE
Patient will work on modifying her diet. She is hoping to increase her exercise. She tends to eat more when cooking dinner and then the dinner she cooks.

## 2023-02-16 NOTE — ACP (ADVANCE CARE PLANNING)
Advance Care Planning   Healthcare Decision Maker:    Primary Decision Maker: Letty Lucrecia - [phone]    Click here to complete Healthcare Decision Makers including selection of the Healthcare Decision Maker Relationship (ie \"Primary\"). Today we documented Decision Maker(s) consistent with Legal Next of Kin hierarchy.        If the relationship to the patient does NOT follow our state's Next of Kin hierarchy, the patient MUST complete an ACP Document to allow him/her to act on the patient's behalf. :

## 2023-02-16 NOTE — PROGRESS NOTES
Bob Resendiz (: 1975) is a 56 y.o. female, here for evaluation of the following chief complaint(s):  Established New Doctor (Pt is here to estab PCP care. Pt is transferring from Dr. Nicho Koenig) and Medication Refill       ASSESSMENT/PLAN:  1. Establishing care with new doctor, encounter for  2. Prediabetes  Assessment & Plan:  Patient will work on modifying her diet. She is hoping to increase her exercise. She tends to eat more when cooking dinner and then the dinner she cooks. Orders:  -     metFORMIN (GLUCOPHAGE-XR) 500 MG extended release tablet; Take 1 tablet by mouth Daily with supper, Disp-90 tablet, R-2Normal  3. Migraine without aura and without status migrainosus, not intractable  Assessment & Plan:   Not experiencing migraines since recently. Related to her menstruation. 4. Vitamin D deficiency  5. Seasonal allergies  -     loratadine (CLARITIN) 10 MG tablet; Take 1 tablet by mouth daily, Disp-90 tablet, R-3Normal  -     montelukast (SINGULAIR) 10 MG tablet; Take 1 tablet by mouth daily, Disp-90 tablet, R-2Normal  -     fluticasone (FLONASE) 50 MCG/ACT nasal spray; 2 sprays by Nasal route daily, Disp-16 g, R-4Normal  6. Primary hypertension  -     metoprolol succinate (TOPROL XL) 25 MG extended release tablet; Take 1 tablet by mouth daily, Disp-90 tablet, R-2Normal  7. Gastroesophageal reflux disease, unspecified whether esophagitis present  -     esomeprazole (NEXIUM) 40 MG delayed release capsule; Take 1 capsule by mouth daily, Disp-90 capsule, R-2Normal             SUBJECTIVE/OBJECTIVE:  HPI: Patient is a very pleasant 52-year-old woman with a medical history significant for hypertension, vitamin D deficiency, prediabetes, cervical disc disease that presents to establish care with new PCP. 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 Patient's A1c previously elevated and treated with metformin 500 mg. Patient's weight has been increasing.   She is interested in working on her dietary and lifestyle changes at home. We will check A1c at this time. Patient will have labs drawn at her office. Patient intolerant of Toprol XL 25 mg for blood pressure control. Patient recently with cardiac work-up that has been benign. Patient's vitamin D levels currently being treated with 50,000 unit supplementation. We will continue. Return to office 1 calendar year. I provided personal cell phone information for patient to contact if necessary. Social History     Tobacco Use    Smoking status: Never    Smokeless tobacco: Never   Vaping Use    Vaping Use: Never used   Substance Use Topics    Alcohol use: Yes     Comment: rarely    Drug use: No     Vitals:    02/16/23 1411   BP: 134/86   Pulse: 92   Temp: 98.4 °F (36.9 °C)   SpO2: 97%   Weight: 217 lb 6.4 oz (98.6 kg)   Height: 5' (1.524 m)      Body mass index is 42.46 kg/m². Physical Exam  An electronic signature was used to authenticate this note.   Mary Corral DO

## 2023-03-02 ENCOUNTER — TELEPHONE (OUTPATIENT)
Dept: INTERNAL MEDICINE CLINIC | Facility: CLINIC | Age: 48
End: 2023-03-02

## 2023-03-02 DIAGNOSIS — R73.03 PREDIABETES: Primary | ICD-10-CM

## 2023-04-06 ENCOUNTER — HOSPITAL ENCOUNTER (OUTPATIENT)
Dept: MAMMOGRAPHY | Age: 48
Discharge: HOME | End: 2023-04-06
Payer: COMMERCIAL

## 2023-04-06 DIAGNOSIS — Z12.31 VISIT FOR SCREENING MAMMOGRAM: ICD-10-CM

## 2023-04-06 PROCEDURE — 77063 BREAST TOMOSYNTHESIS BI: CPT

## 2023-05-19 ENCOUNTER — TELEPHONE (OUTPATIENT)
Dept: INTERNAL MEDICINE CLINIC | Facility: CLINIC | Age: 48
End: 2023-05-19

## 2023-05-19 DIAGNOSIS — J00 ACUTE NASOPHARYNGITIS: Primary | ICD-10-CM

## 2023-05-19 RX ORDER — AZITHROMYCIN 250 MG/1
250 TABLET, FILM COATED ORAL SEE ADMIN INSTRUCTIONS
Qty: 6 TABLET | Refills: 0 | Status: SHIPPED | OUTPATIENT
Start: 2023-05-19 | End: 2023-05-24

## 2023-06-27 ENCOUNTER — HOSPITAL ENCOUNTER (OUTPATIENT)
Dept: GENERAL RADIOLOGY | Age: 48
Discharge: HOME | End: 2023-06-30
Payer: COMMERCIAL

## 2023-06-27 ENCOUNTER — TELEPHONE (OUTPATIENT)
Dept: INTERNAL MEDICINE CLINIC | Facility: CLINIC | Age: 48
End: 2023-06-27

## 2023-06-27 DIAGNOSIS — R07.89 CHEST TIGHTNESS: Primary | ICD-10-CM

## 2023-06-27 PROCEDURE — 71046 X-RAY EXAM CHEST 2 VIEWS: CPT

## 2023-06-27 RX ORDER — PREDNISONE 10 MG/1
10 TABLET ORAL DAILY
Qty: 10 TABLET | Refills: 0 | Status: SHIPPED | OUTPATIENT
Start: 2023-06-27 | End: 2023-07-07

## 2023-06-28 DIAGNOSIS — R07.82 INTERCOSTAL PAIN: ICD-10-CM

## 2023-06-28 DIAGNOSIS — M79.10 MYALGIA: ICD-10-CM

## 2023-06-28 DIAGNOSIS — R53.82 CHRONIC FATIGUE: ICD-10-CM

## 2023-06-28 DIAGNOSIS — R53.82 CHRONIC FATIGUE: Primary | ICD-10-CM

## 2023-06-28 LAB
BASOPHILS # BLD: 0 K/UL (ref 0–0.2)
BASOPHILS NFR BLD: 0 % (ref 0–2)
DIFFERENTIAL METHOD BLD: ABNORMAL
EOSINOPHIL # BLD: 0 K/UL (ref 0–0.8)
EOSINOPHIL NFR BLD: 0 % (ref 0.5–7.8)
ERYTHROCYTE [DISTWIDTH] IN BLOOD BY AUTOMATED COUNT: 12.7 % (ref 11.9–14.6)
HCT VFR BLD AUTO: 41.2 % (ref 35.8–46.3)
HGB BLD-MCNC: 14 G/DL (ref 11.7–15.4)
IMM GRANULOCYTES # BLD AUTO: 0 K/UL (ref 0–0.5)
IMM GRANULOCYTES NFR BLD AUTO: 0 % (ref 0–5)
LYMPHOCYTES # BLD: 0.9 K/UL (ref 0.5–4.6)
LYMPHOCYTES NFR BLD: 13 % (ref 13–44)
MCH RBC QN AUTO: 31.2 PG (ref 26.1–32.9)
MCHC RBC AUTO-ENTMCNC: 34 G/DL (ref 31.4–35)
MCV RBC AUTO: 91.8 FL (ref 82–102)
MONOCYTES # BLD: 0.4 K/UL (ref 0.1–1.3)
MONOCYTES NFR BLD: 5 % (ref 4–12)
NEUTS SEG # BLD: 5.6 K/UL (ref 1.7–8.2)
NEUTS SEG NFR BLD: 82 % (ref 43–78)
NRBC # BLD: 0 K/UL (ref 0–0.2)
PLATELET # BLD AUTO: 419 K/UL (ref 150–450)
PMV BLD AUTO: 9.9 FL (ref 9.4–12.3)
RBC # BLD AUTO: 4.49 M/UL (ref 4.05–5.2)
WBC # BLD AUTO: 6.9 K/UL (ref 4.3–11.1)

## 2023-06-29 ENCOUNTER — TELEPHONE (OUTPATIENT)
Dept: INTERNAL MEDICINE CLINIC | Facility: CLINIC | Age: 48
End: 2023-06-29

## 2023-06-29 DIAGNOSIS — S00.06XA TICK BITE OF SCALP, INITIAL ENCOUNTER: Primary | ICD-10-CM

## 2023-06-29 DIAGNOSIS — R76.8 RHEUMATOID FACTOR POSITIVE: ICD-10-CM

## 2023-06-29 DIAGNOSIS — R53.83 FATIGUE, UNSPECIFIED TYPE: ICD-10-CM
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
## 2023-06-29 LAB
ALBUMIN SERPL-MCNC: 4 G/DL (ref 3.5–5)
ALBUMIN/GLOB SERPL: 1.1 (ref 0.4–1.6)
ALP SERPL-CCNC: 56 U/L (ref 50–136)
ALT SERPL-CCNC: 48 U/L (ref 12–65)
ANION GAP SERPL CALC-SCNC: 8 MMOL/L (ref 2–11)
AST SERPL-CCNC: 23 U/L (ref 15–37)
BILIRUB SERPL-MCNC: 0.3 MG/DL (ref 0.2–1.1)
BUN SERPL-MCNC: 9 MG/DL (ref 6–23)
CALCIUM SERPL-MCNC: 9.3 MG/DL (ref 8.3–10.4)
CHLORIDE SERPL-SCNC: 105 MMOL/L (ref 101–110)
CO2 SERPL-SCNC: 24 MMOL/L (ref 21–32)
CREAT SERPL-MCNC: 0.8 MG/DL (ref 0.6–1)
GLOBULIN SER CALC-MCNC: 3.5 G/DL (ref 2.8–4.5)
GLUCOSE SERPL-MCNC: 120 MG/DL (ref 65–100)
POTASSIUM SERPL-SCNC: 3.9 MMOL/L (ref 3.5–5.1)
PROT SERPL-MCNC: 7.5 G/DL (ref 6.3–8.2)
RHEUMATOID FACT SER QL LA: NEGATIVE
SODIUM SERPL-SCNC: 137 MMOL/L (ref 133–143)
T4 FREE SERPL-MCNC: 1.4 NG/DL (ref 0.78–1.46)
TSH, 3RD GENERATION: 1.11 UIU/ML (ref 0.36–3.74)

## 2023-06-29 RX ORDER — PREDNISONE 5 MG/1
TABLET ORAL
Qty: 90 TABLET | Refills: 1 | Status: SHIPPED | OUTPATIENT
Start: 2023-06-29

## 2023-06-30 ENCOUNTER — TELEPHONE (OUTPATIENT)
Dept: INTERNAL MEDICINE CLINIC | Facility: CLINIC | Age: 48
End: 2023-06-30

## 2023-06-30 ENCOUNTER — OFFICE VISIT (OUTPATIENT)
Dept: INTERNAL MEDICINE CLINIC | Facility: CLINIC | Age: 48
End: 2023-06-30
Payer: COMMERCIAL

## 2023-06-30 VITALS
OXYGEN SATURATION: 99 % | WEIGHT: 211.8 LBS | HEART RATE: 74 BPM | BODY MASS INDEX: 41.58 KG/M2 | SYSTOLIC BLOOD PRESSURE: 110 MMHG | HEIGHT: 60 IN | TEMPERATURE: 98.2 F | DIASTOLIC BLOOD PRESSURE: 73 MMHG

## 2023-06-30 DIAGNOSIS — R07.1 CHEST PAIN ON BREATHING: Primary | ICD-10-CM

## 2023-06-30 DIAGNOSIS — R53.83 FATIGUE, UNSPECIFIED TYPE: ICD-10-CM

## 2023-06-30 DIAGNOSIS — R53.83 OTHER FATIGUE: ICD-10-CM

## 2023-06-30 DIAGNOSIS — S00.06XA TICK BITE OF SCALP, INITIAL ENCOUNTER: ICD-10-CM

## 2023-06-30 DIAGNOSIS — R06.02 SOB (SHORTNESS OF BREATH): ICD-10-CM
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
## 2023-06-30 DIAGNOSIS — R07.89 CHEST TIGHTNESS: ICD-10-CM

## 2023-06-30 DIAGNOSIS — R76.8 RHEUMATOID FACTOR POSITIVE: ICD-10-CM

## 2023-06-30 DIAGNOSIS — R06.02 SOB (SHORTNESS OF BREATH): Primary | ICD-10-CM

## 2023-06-30 DIAGNOSIS — R09.89 UPPER RESPIRATORY SYMPTOM: ICD-10-CM

## 2023-06-30 LAB
25(OH)D3 SERPL-MCNC: 17.1 NG/ML (ref 30–100)
ANA SER QL: NEGATIVE
CEA SERPL-MCNC: 0.9 NG/ML (ref 0–3)
FERRITIN SERPL-MCNC: 25 NG/ML (ref 8–388)
TIBC SERPL-MCNC: 391 UG/DL (ref 250–450)

## 2023-06-30 PROCEDURE — 99214 OFFICE O/P EST MOD 30 MIN: CPT | Performed by: INTERNAL MEDICINE

## 2023-06-30 RX ORDER — ERGOCALCIFEROL 1.25 MG/1
50000 CAPSULE ORAL WEEKLY
Qty: 12 CAPSULE | Refills: 3 | Status: SHIPPED | OUTPATIENT
Start: 2023-06-30

## 2023-07-04 LAB
GLIADIN PEPTIDE IGA SER-ACNC: 5 UNITS (ref 0–19)
GLIADIN PEPTIDE IGG SER-ACNC: 2 UNITS (ref 0–19)
IGA SERPL-MCNC: 114 MG/DL (ref 87–352)
TTG IGA SER-ACNC: <2 U/ML (ref 0–3)
TTG IGG SER-ACNC: <2 U/ML (ref 0–5)

## 2023-07-05 ENCOUNTER — TELEPHONE (OUTPATIENT)
Dept: INTERNAL MEDICINE CLINIC | Facility: CLINIC | Age: 48
End: 2023-07-05

## 2023-07-05 LAB

## 2023-07-05 RX ORDER — CIPROFLOXACIN 0.5 MG/.25ML
0.25 SOLUTION/ DROPS AURICULAR (OTIC) 2 TIMES DAILY
Qty: 14 EACH | Refills: 0 | Status: SHIPPED | OUTPATIENT
Start: 2023-07-05 | End: 2023-07-12

## 2023-07-07 LAB
ACHR AB SER-SCNC: <0.03 NMOL/L (ref 0–0.24)
ACHR BLOCK AB SER-ACNC: 15 % (ref 0–25)
ACHR MOD AB/ACHR TOTAL SFR SER: NORMAL %
ACHR MODULATING AB: 0 % (ref 0–45)

## 2023-07-20 ENCOUNTER — HOSPITAL ENCOUNTER (OUTPATIENT)
Dept: CT IMAGING | Age: 48
Discharge: HOME | End: 2023-07-22
Payer: COMMERCIAL

## 2023-07-20 DIAGNOSIS — R09.89 UPPER RESPIRATORY SYMPTOM: ICD-10-CM

## 2023-07-20 DIAGNOSIS — R07.1 CHEST PAIN ON BREATHING: ICD-10-CM

## 2023-07-20 DIAGNOSIS — R53.83 OTHER FATIGUE: ICD-10-CM

## 2023-07-20 DIAGNOSIS — R06.02 SOB (SHORTNESS OF BREATH): ICD-10-CM

## 2023-07-20 PROCEDURE — 71250 CT THORAX DX C-: CPT

## 2023-07-27 ENCOUNTER — NURSE ONLY (OUTPATIENT)
Dept: PULMONOLOGY | Age: 48
End: 2023-07-27
Payer: COMMERCIAL

## 2023-07-27 DIAGNOSIS — R07.89 CHEST TIGHTNESS: ICD-10-CM

## 2023-07-27 DIAGNOSIS — R06.02 SOB (SHORTNESS OF BREATH): ICD-10-CM

## 2023-07-27 LAB
FEV 1 , POC: 2.85 L
FEV1 % PRED, POC: 116 %
FEV1/FVC, POC: NORMAL
FVC % PRED, POC: 116 %
FVC, POC: NORMAL

## 2023-07-27 PROCEDURE — 94060 EVALUATION OF WHEEZING: CPT | Performed by: INTERNAL MEDICINE

## 2023-07-27 PROCEDURE — 94726 PLETHYSMOGRAPHY LUNG VOLUMES: CPT | Performed by: INTERNAL MEDICINE

## 2023-07-27 PROCEDURE — 94729 DIFFUSING CAPACITY: CPT | Performed by: INTERNAL MEDICINE

## 2023-07-27 ASSESSMENT — PULMONARY FUNCTION TESTS
FEV1_PERCENT_PREDICTED_POC: 116
FVC_PERCENT_PREDICTED_POC: 116

## 2023-09-19 ENCOUNTER — TELEPHONE (OUTPATIENT)
Dept: INTERNAL MEDICINE CLINIC | Facility: CLINIC | Age: 48
End: 2023-09-19

## 2023-09-19 RX ORDER — AZITHROMYCIN 250 MG/1
250 TABLET, FILM COATED ORAL SEE ADMIN INSTRUCTIONS
Qty: 6 TABLET | Refills: 0 | Status: SHIPPED | OUTPATIENT
Start: 2023-09-19 | End: 2023-09-24

## 2023-12-05 DIAGNOSIS — K21.9 GASTROESOPHAGEAL REFLUX DISEASE, UNSPECIFIED WHETHER ESOPHAGITIS PRESENT: ICD-10-CM

## 2023-12-05 DIAGNOSIS — I10 PRIMARY HYPERTENSION: ICD-10-CM

## 2023-12-05 DIAGNOSIS — R73.03 PREDIABETES: ICD-10-CM

## 2023-12-06 DIAGNOSIS — R73.03 PREDIABETES: ICD-10-CM

## 2023-12-06 DIAGNOSIS — I10 PRIMARY HYPERTENSION: ICD-10-CM

## 2023-12-06 RX ORDER — METFORMIN HYDROCHLORIDE 500 MG/1
TABLET, EXTENDED RELEASE ORAL
Qty: 90 TABLET | Refills: 2 | Status: SHIPPED | OUTPATIENT
Start: 2023-12-06 | End: 2023-12-06 | Stop reason: SDUPTHER

## 2023-12-06 RX ORDER — METFORMIN HYDROCHLORIDE 500 MG/1
TABLET, EXTENDED RELEASE ORAL
Qty: 90 TABLET | Refills: 2 | Status: SHIPPED | OUTPATIENT
Start: 2023-12-06

## 2023-12-06 RX ORDER — METOPROLOL SUCCINATE 25 MG/1
25 TABLET, EXTENDED RELEASE ORAL DAILY
Qty: 90 TABLET | Refills: 2 | Status: SHIPPED | OUTPATIENT
Start: 2023-12-06

## 2023-12-06 RX ORDER — ESOMEPRAZOLE MAGNESIUM 40 MG/1
40 CAPSULE, DELAYED RELEASE ORAL DAILY
Qty: 90 CAPSULE | Refills: 2 | Status: SHIPPED | OUTPATIENT
Start: 2023-12-06

## 2023-12-06 RX ORDER — METOPROLOL SUCCINATE 25 MG/1
25 TABLET, EXTENDED RELEASE ORAL DAILY
Qty: 90 TABLET | Refills: 2 | Status: SHIPPED | OUTPATIENT
Start: 2023-12-06 | End: 2023-12-06 | Stop reason: SDUPTHER

## 2023-12-06 NOTE — TELEPHONE ENCOUNTER
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 Thank you.

## 2024-02-13 DIAGNOSIS — U07.1 COVID: Primary | ICD-10-CM

## 2024-02-13 NOTE — PROGRESS NOTES
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  She tested herself in the office for COVID and was positive.  She is nontoxic.  She asked me to call in Paxlovid for her.  The risks and benefits of treatment are discussed.  Given her comorbidities I think this is reasonable.      Orders Placed This Encounter    nirmatrelvir/ritonavir 300/100 (PAXLOVID, 300/100,) 20 x 150 MG & 10 x 100MG TBPK     Sig: Take 3 tablets (two 150 mg nirmatrelvir and one 100 mg ritonavir tablets) by mouth every 12 hours for 5 days.     Dispense:  30 tablet     Refill:  0     Order Specific Question:   Does this patient qualify for COVID-19 antIviral therapy based on criteria for treatment?     Answer:   Yes       You tested positive for Covid 2/13/24.   I sent the following prescription(s) to your pharmacy: antiviral medication- Paxlovid.  You should also try to drink plenty of fluids and rest.  For additional symptom relief, I suggest the following over-the-counter remedies:   For fevers or pain: acetaminophen (Tylenol)  For dry cough: medications containing dextromethorphan, such as Delsym, Robitussin DM or Mucinex DM and medicated throat lozenges  For congestion or sinus pressure: decongestant nasal sprays, such as Afrin (for up to 3 days), nasal steroid sprays, such as Flonase, Sensimist, Rhinocort or Nasonex, and saline nasal sprays, neti pot or sinus rinse bottle  If you have high blood pressure, you should avoid medications containing pseudoephedrine or phenylephrine, such as Sudafed.  A humidifier in your bedroom may be comforting for congestion symptoms.  If your cough is keeping you awake at night, you can try raising your head with an extra pillow.      Please contact our office if your symptoms worsen or do not improve within 7-10 days.    Please seek more urgent medical attention if you develop any of the following:  Chest pain  Shortness of breath  Fevers

## 2024-06-03 ENCOUNTER — TRANSCRIBE ORDERS (OUTPATIENT)
Dept: SCHEDULING | Age: 49
End: 2024-06-03

## 2024-06-03 DIAGNOSIS — Z12.31 SCREENING MAMMOGRAM FOR HIGH-RISK PATIENT: Primary | ICD-10-CM

## 2024-07-08 ENCOUNTER — HOSPITAL ENCOUNTER (OUTPATIENT)
Dept: MAMMOGRAPHY | Age: 49
Discharge: HOME | End: 2024-07-11
Attending: OBSTETRICS & GYNECOLOGY
Payer: COMMERCIAL

## 2024-07-08 VITALS — BODY MASS INDEX: 45.16 KG/M2 | WEIGHT: 230 LBS | HEIGHT: 60 IN

## 2024-07-08 DIAGNOSIS — Z12.31 SCREENING MAMMOGRAM FOR HIGH-RISK PATIENT: ICD-10-CM

## 2024-07-08 PROCEDURE — 77063 BREAST TOMOSYNTHESIS BI: CPT

## 2024-07-30 RX ORDER — OFLOXACIN 3 MG/ML
5 SOLUTION AURICULAR (OTIC) 2 TIMES DAILY
Qty: 5 ML | Refills: 0 | Status: SHIPPED | OUTPATIENT
Start: 2024-07-30 | End: 2024-08-09

## 2024-09-09 ENCOUNTER — OFFICE VISIT (OUTPATIENT)
Dept: INTERNAL MEDICINE CLINIC | Facility: CLINIC | Age: 49
End: 2024-09-09

## 2024-09-09 VITALS
TEMPERATURE: 97.3 F | BODY MASS INDEX: 44.96 KG/M2 | RESPIRATION RATE: 16 BRPM | HEIGHT: 60 IN | WEIGHT: 229 LBS | OXYGEN SATURATION: 97 % | DIASTOLIC BLOOD PRESSURE: 65 MMHG | SYSTOLIC BLOOD PRESSURE: 124 MMHG | HEART RATE: 86 BPM

## 2024-09-09 DIAGNOSIS — R73.03 PREDIABETES: ICD-10-CM

## 2024-09-09 DIAGNOSIS — E78.2 MIXED HYPERLIPIDEMIA: ICD-10-CM

## 2024-09-09 DIAGNOSIS — J30.2 SEASONAL ALLERGIES: ICD-10-CM

## 2024-09-09 DIAGNOSIS — E55.9 VITAMIN D DEFICIENCY: ICD-10-CM

## 2024-09-09 DIAGNOSIS — G43.909 MIGRAINE SYNDROME: ICD-10-CM

## 2024-09-09 DIAGNOSIS — Z23 NEEDS FLU SHOT: ICD-10-CM

## 2024-09-09 DIAGNOSIS — Z00.00 ANNUAL PHYSICAL EXAM: Primary | ICD-10-CM

## 2024-09-09 DIAGNOSIS — Z00.00 ANNUAL PHYSICAL EXAM: ICD-10-CM

## 2024-09-09 DIAGNOSIS — K76.0 FATTY LIVER: ICD-10-CM

## 2024-09-09 DIAGNOSIS — Z12.12 ENCOUNTER FOR COLORECTAL CANCER SCREENING: ICD-10-CM

## 2024-09-09 DIAGNOSIS — Z12.11 ENCOUNTER FOR COLORECTAL CANCER SCREENING: ICD-10-CM

## 2024-09-09 DIAGNOSIS — K21.9 GASTROESOPHAGEAL REFLUX DISEASE, UNSPECIFIED WHETHER ESOPHAGITIS PRESENT: ICD-10-CM

## 2024-09-09 PROBLEM — G43.009 MIGRAINE WITHOUT AURA AND WITHOUT STATUS MIGRAINOSUS, NOT INTRACTABLE: Status: RESOLVED | Noted: 2017-08-23 | Resolved: 2024-09-09

## 2024-09-09 PROBLEM — B34.9 ACUTE VIRAL DISEASE: Status: RESOLVED | Noted: 2022-05-04 | Resolved: 2024-09-09

## 2024-09-09 PROBLEM — R06.02 SOB (SHORTNESS OF BREATH): Status: RESOLVED | Noted: 2022-05-04 | Resolved: 2024-09-09

## 2024-09-09 PROBLEM — U07.1 COVID: Status: RESOLVED | Noted: 2024-02-13 | Resolved: 2024-09-09

## 2024-09-09 PROBLEM — R07.89 CHEST TIGHTNESS: Status: RESOLVED | Noted: 2022-05-04 | Resolved: 2024-09-09

## 2024-09-09 LAB
25(OH)D3 SERPL-MCNC: 24.4 NG/ML (ref 30–100)
ALBUMIN SERPL-MCNC: 3.7 G/DL (ref 3.5–5)
ALBUMIN/GLOB SERPL: 1.2 (ref 1–1.9)
ALP SERPL-CCNC: 58 U/L (ref 35–104)
ALT SERPL-CCNC: 23 U/L (ref 12–65)
ANION GAP SERPL CALC-SCNC: 14 MMOL/L (ref 9–18)
AST SERPL-CCNC: 22 U/L (ref 15–37)
BASOPHILS # BLD: 0 K/UL (ref 0–0.2)
BASOPHILS NFR BLD: 1 % (ref 0–2)
BILIRUB SERPL-MCNC: 0.3 MG/DL (ref 0–1.2)
BUN SERPL-MCNC: 14 MG/DL (ref 6–23)
CALCIUM SERPL-MCNC: 9.2 MG/DL (ref 8.8–10.2)
CHLORIDE SERPL-SCNC: 105 MMOL/L (ref 98–107)
CHOLEST SERPL-MCNC: 263 MG/DL (ref 0–200)
CO2 SERPL-SCNC: 21 MMOL/L (ref 20–28)
CREAT SERPL-MCNC: 0.73 MG/DL (ref 0.6–1.1)
DIFFERENTIAL METHOD BLD: NORMAL
EOSINOPHIL # BLD: 0.1 K/UL (ref 0–0.8)
EOSINOPHIL NFR BLD: 1 % (ref 0.5–7.8)
ERYTHROCYTE [DISTWIDTH] IN BLOOD BY AUTOMATED COUNT: 13.8 % (ref 11.9–14.6)
EST. AVERAGE GLUCOSE BLD GHB EST-MCNC: 136 MG/DL
GLOBULIN SER CALC-MCNC: 3.2 G/DL (ref 2.3–3.5)
GLUCOSE SERPL-MCNC: 111 MG/DL (ref 70–99)
HBA1C MFR BLD: 6.4 % (ref 0–5.6)
HCT VFR BLD AUTO: 42.1 % (ref 35.8–46.3)
HDLC SERPL-MCNC: 57 MG/DL (ref 40–60)
HDLC SERPL: 4.6 (ref 0–5)
HGB BLD-MCNC: 13.5 G/DL (ref 11.7–15.4)
IMM GRANULOCYTES # BLD AUTO: 0 K/UL (ref 0–0.5)
IMM GRANULOCYTES NFR BLD AUTO: 1 % (ref 0–5)
LDLC SERPL CALC-MCNC: 180 MG/DL (ref 0–100)
LYMPHOCYTES # BLD: 1.8 K/UL (ref 0.5–4.6)
LYMPHOCYTES NFR BLD: 29 % (ref 13–44)
MCH RBC QN AUTO: 29.7 PG (ref 26.1–32.9)
MCHC RBC AUTO-ENTMCNC: 32.1 G/DL (ref 31.4–35)
MCV RBC AUTO: 92.5 FL (ref 82–102)
MONOCYTES # BLD: 0.8 K/UL (ref 0.1–1.3)
MONOCYTES NFR BLD: 12 % (ref 4–12)
NEUTS SEG # BLD: 3.5 K/UL (ref 1.7–8.2)
NEUTS SEG NFR BLD: 56 % (ref 43–78)
NRBC # BLD: 0 K/UL (ref 0–0.2)
PLATELET # BLD AUTO: 325 K/UL (ref 150–450)
PMV BLD AUTO: 10 FL (ref 9.4–12.3)
POTASSIUM SERPL-SCNC: 4.4 MMOL/L (ref 3.5–5.1)
PROT SERPL-MCNC: 6.9 G/DL (ref 6.3–8.2)
RBC # BLD AUTO: 4.55 M/UL (ref 4.05–5.2)
SODIUM SERPL-SCNC: 140 MMOL/L (ref 136–145)
TRIGL SERPL-MCNC: 132 MG/DL (ref 0–150)
TSH W FREE THYROID IF ABNORMAL: 3.22 UIU/ML (ref 0.27–4.2)
VLDLC SERPL CALC-MCNC: 26 MG/DL (ref 6–23)
WBC # BLD AUTO: 6.2 K/UL (ref 4.3–11.1)

## 2024-09-09 RX ORDER — ERGOCALCIFEROL 1.25 MG/1
50000 CAPSULE, LIQUID FILLED ORAL WEEKLY
Qty: 12 CAPSULE | Refills: 3 | Status: CANCELLED | OUTPATIENT
Start: 2024-09-09

## 2024-09-09 RX ORDER — ERGOCALCIFEROL 1.25 MG/1
50000 CAPSULE, LIQUID FILLED ORAL WEEKLY
Qty: 12 CAPSULE | Refills: 3 | Status: SHIPPED | OUTPATIENT
Start: 2024-09-09

## 2024-09-09 RX ORDER — PREDNISONE 5 MG/1
TABLET ORAL
Qty: 90 TABLET | Refills: 1 | Status: CANCELLED | OUTPATIENT
Start: 2024-09-09

## 2024-09-09 RX ORDER — METFORMIN HCL 500 MG
TABLET, EXTENDED RELEASE 24 HR ORAL
Qty: 90 TABLET | Refills: 3 | Status: SHIPPED | OUTPATIENT
Start: 2024-09-09

## 2024-09-09 RX ORDER — ESOMEPRAZOLE MAGNESIUM 40 MG/1
40 CAPSULE, DELAYED RELEASE ORAL DAILY
Qty: 90 CAPSULE | Refills: 3 | Status: SHIPPED | OUTPATIENT
Start: 2024-09-09

## 2024-09-09 SDOH — ECONOMIC STABILITY: FOOD INSECURITY: WITHIN THE PAST 12 MONTHS, THE FOOD YOU BOUGHT JUST DIDN'T LAST AND YOU DIDN'T HAVE MONEY TO GET MORE.: PATIENT DECLINED

## 2024-09-09 SDOH — ECONOMIC STABILITY: FOOD INSECURITY: WITHIN THE PAST 12 MONTHS, YOU WORRIED THAT YOUR FOOD WOULD RUN OUT BEFORE YOU GOT MONEY TO BUY MORE.: PATIENT DECLINED

## 2024-09-09 SDOH — ECONOMIC STABILITY: INCOME INSECURITY: HOW HARD IS IT FOR YOU TO PAY FOR THE VERY BASICS LIKE FOOD, HOUSING, MEDICAL CARE, AND HEATING?: NOT HARD AT ALL

## 2024-09-09 ASSESSMENT — ENCOUNTER SYMPTOMS
VOMITING: 0
SHORTNESS OF BREATH: 0
NAUSEA: 0
COUGH: 0
WHEEZING: 0
ABDOMINAL PAIN: 0
DIARRHEA: 0

## 2024-09-09 ASSESSMENT — PATIENT HEALTH QUESTIONNAIRE - PHQ9
SUM OF ALL RESPONSES TO PHQ QUESTIONS 1-9: 0
1. LITTLE INTEREST OR PLEASURE IN DOING THINGS: NOT AT ALL
SUM OF ALL RESPONSES TO PHQ QUESTIONS 1-9: 0
SUM OF ALL RESPONSES TO PHQ QUESTIONS 1-9: 0
2. FEELING DOWN, DEPRESSED OR HOPELESS: NOT AT ALL
SUM OF ALL RESPONSES TO PHQ QUESTIONS 1-9: 0
SUM OF ALL RESPONSES TO PHQ9 QUESTIONS 1 & 2: 0

## 2024-09-30 ENCOUNTER — TELEPHONE (OUTPATIENT)
Dept: INTERNAL MEDICINE CLINIC | Facility: CLINIC | Age: 49
End: 2024-09-30

## 2024-09-30 RX ORDER — AZITHROMYCIN 250 MG/1
TABLET, FILM COATED ORAL
Qty: 6 TABLET | Refills: 0 | Status: SHIPPED | OUTPATIENT
Start: 2024-09-30 | End: 2024-10-10

## 2024-10-18 DIAGNOSIS — R68.89 FLU-LIKE SYMPTOMS: Primary | ICD-10-CM

## 2024-10-18 NOTE — TELEPHONE ENCOUNTER
Patient currently symptomatic with bodyaches, cough, chest tightness, fatigue and chills.  Recently exposed to COVID.  Rapid COVID, flu and RSV testing ordered to exclude these as cause for symptoms    Orders Placed This Encounter    AMB POC COVID-19 COV     Order Specific Question:   Pregnant?     Answer:   No    AMB POC RAPID INFLUENZA TEST    AMB POC RSV

## 2024-10-21 ENCOUNTER — TELEPHONE (OUTPATIENT)
Dept: INTERNAL MEDICINE CLINIC | Facility: CLINIC | Age: 49
End: 2024-10-21

## 2024-10-21 DIAGNOSIS — Z12.11 ENCOUNTER FOR COLORECTAL CANCER SCREENING: Primary | ICD-10-CM

## 2024-10-21 DIAGNOSIS — Z12.12 ENCOUNTER FOR COLORECTAL CANCER SCREENING: Primary | ICD-10-CM

## 2024-10-21 NOTE — TELEPHONE ENCOUNTER
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  She asked that I contact you and let you know that she still has NOT received her Cologuard.  She says it's been about a month and it still has not come.  She says she spoke to her insurance and they told her it is covered.  She wants to know if you can send another order in?  Please advise.  Thank you.

## 2024-10-28 ENCOUNTER — OFFICE VISIT (OUTPATIENT)
Age: 49
End: 2024-10-28
Payer: COMMERCIAL

## 2024-10-28 VITALS
BODY MASS INDEX: 45.35 KG/M2 | SYSTOLIC BLOOD PRESSURE: 118 MMHG | HEIGHT: 60 IN | WEIGHT: 231 LBS | HEART RATE: 75 BPM | DIASTOLIC BLOOD PRESSURE: 80 MMHG

## 2024-10-28 DIAGNOSIS — R06.02 SOB (SHORTNESS OF BREATH): ICD-10-CM

## 2024-10-28 DIAGNOSIS — I10 PRIMARY HYPERTENSION: ICD-10-CM

## 2024-10-28 DIAGNOSIS — Z76.89 ENCOUNTER TO ESTABLISH CARE: Primary | ICD-10-CM

## 2024-10-28 PROCEDURE — 99214 OFFICE O/P EST MOD 30 MIN: CPT | Performed by: INTERNAL MEDICINE

## 2024-10-28 PROCEDURE — 93000 ELECTROCARDIOGRAM COMPLETE: CPT | Performed by: INTERNAL MEDICINE

## 2024-10-28 PROCEDURE — 3074F SYST BP LT 130 MM HG: CPT | Performed by: INTERNAL MEDICINE

## 2024-10-28 PROCEDURE — 3079F DIAST BP 80-89 MM HG: CPT | Performed by: INTERNAL MEDICINE

## 2024-10-28 RX ORDER — METOPROLOL SUCCINATE 25 MG/1
25 TABLET, EXTENDED RELEASE ORAL DAILY
Qty: 90 TABLET | Refills: 3 | Status: SHIPPED | OUTPATIENT
Start: 2024-10-28

## 2024-10-28 ASSESSMENT — ENCOUNTER SYMPTOMS
SHORTNESS OF BREATH: 0
ABDOMINAL PAIN: 0

## 2024-10-28 NOTE — PROGRESS NOTES
Paternal Grandmother      Social History     Tobacco Use    Smoking status: Never    Smokeless tobacco: Never   Substance Use Topics    Alcohol use: Yes     Comment: rarely       ROS:    Review of Systems   Constitutional: Negative for chills, diaphoresis and fever.   HENT:  Negative for hearing loss.    Eyes:  Negative for visual disturbance.   Cardiovascular:         As per the HPI   Respiratory:  Negative for shortness of breath.    Hematologic/Lymphatic: Does not bruise/bleed easily.   Gastrointestinal:  Negative for abdominal pain.   Genitourinary:  Negative for dysuria.   Neurological:  Negative for focal weakness.   Psychiatric/Behavioral:  Negative for suicidal ideas.           PHYSICAL EXAM:   /80   Pulse 75   Ht 1.524 m (5')   Wt 104.8 kg (231 lb)   BMI 45.11 kg/m²      Wt Readings from Last 3 Encounters:   10/28/24 104.8 kg (231 lb)   09/09/24 103.9 kg (229 lb)   07/08/24 104.3 kg (230 lb)     BP Readings from Last 3 Encounters:   10/28/24 118/80   09/09/24 124/65   06/30/23 110/73     Pulse Readings from Last 3 Encounters:   10/28/24 75   09/09/24 86   06/30/23 74           Physical Exam  Vitals reviewed.   Constitutional:       Appearance: Normal appearance.      Comments: Appears younger than stated age   HENT:      Head: Normocephalic and atraumatic.   Eyes:      General: No scleral icterus.  Neck:      Vascular: No carotid bruit.   Cardiovascular:      Rate and Rhythm: Normal rate and regular rhythm.      Heart sounds: No murmur heard.     No gallop.   Pulmonary:      Breath sounds: Normal breath sounds.   Abdominal:      Palpations: Abdomen is soft.   Musculoskeletal:         General: No swelling.      Cervical back: Neck supple.   Skin:     General: Skin is warm and dry.   Neurological:      Mental Status: She is alert and oriented to person, place, and time.   Psychiatric:         Mood and Affect: Mood normal.         Medical problems and test results were reviewed with the patient

## 2024-12-13 NOTE — DISCHARGE INSTRUCTIONS
Dizziness: Care Instructions  Your Care Instructions  Dizziness is the feeling of unsteadiness or fuzziness in your head. It is different than having vertigo, which is a feeling that the room is spinning or that you are moving or falling. It is also different from lightheadedness, which is the feeling that you are about to faint. It can be hard to know what causes dizziness. Some people feel dizzy when they have migraine headaches. Sometimes bouts of flu can make you feel dizzy. Some medical conditions, such as heart problems or high blood pressure, can make you feel dizzy. Many medicines can cause dizziness, including medicines for high blood pressure, pain, or anxiety. If a medicine causes your symptoms, your doctor may recommend that you stop or change the medicine. If it is a problem with your heart, you may need medicine to help your heart work better. If there is no clear reason for your symptoms, your doctor may suggest watching and waiting for a while to see if the dizziness goes away on its own. Follow-up care is a key part of your treatment and safety. Be sure to make and go to all appointments, and call your doctor if you are having problems. It's also a good idea to know your test results and keep a list of the medicines you take. How can you care for yourself at home? · If your doctor recommends or prescribes medicine, take it exactly as directed. Call your doctor if you think you are having a problem with your medicine. · Do not drive while you feel dizzy. · Try to prevent falls. Steps you can take include:  ¨ Using nonskid mats, adding grab bars near the tub, and using night-lights. ¨ Clearing your home so that walkways are free of anything you might trip on. ¨ Letting family and friends know that you have been feeling dizzy. This will help them know how to help you. When should you call for help? Call 911 anytime you think you may need emergency care.  For example, call if:  · You passed out (lost consciousness). · You have dizziness along with symptoms of a heart attack. These may include:  ¨ Chest pain or pressure, or a strange feeling in the chest.  ¨ Sweating. ¨ Shortness of breath. ¨ Nausea or vomiting. ¨ Pain, pressure, or a strange feeling in the back, neck, jaw, or upper belly or in one or both shoulders or arms. ¨ Lightheadedness or sudden weakness. ¨ A fast or irregular heartbeat. · You have symptoms of a stroke. These may include:  ¨ Sudden numbness, tingling, weakness, or loss of movement in your face, arm, or leg, especially on only one side of your body. ¨ Sudden vision changes. ¨ Sudden trouble speaking. ¨ Sudden confusion or trouble understanding simple statements. ¨ Sudden problems with walking or balance. ¨ A sudden, severe headache that is different from past headaches. Call your doctor now or seek immediate medical care if:  · You feel dizzy and have a fever, headache, or ringing in your ears. · You have new or increased nausea and vomiting. · Your dizziness does not go away or comes back. Watch closely for changes in your health, and be sure to contact your doctor if:  · You do not get better as expected. Where can you learn more? Go to http://conner-nicolas.info/. Enter J271 in the search box to learn more about \"Dizziness: Care Instructions. \"  Current as of: March 20, 2017  Content Version: 11.3  © 8310-0328 [employer]. Care instructions adapted under license by [employer] (which disclaims liability or warranty for this information). If you have questions about a medical condition or this instruction, always ask your healthcare professional. [place] [address] any warranty or liability for your use of this information. Headache: Care Instructions  Your Care Instructions    Headaches have many possible causes.  Most headaches aren't a sign of a more serious problem, and they will get Take 1 capsule by mouth Daily 90 capsule 1    buPROPion (WELLBUTRIN XL) 300 MG extended release tablet TAKE ONE TABLET BY MOUTH EVERY DAY IN THE MORNING 90 tablet 1    albuterol sulfate HFA (VENTOLIN HFA) 108 (90 Base) MCG/ACT inhaler Inhale 2 puffs into the lungs 4 times daily as needed for Wheezing 54 g 1    ondansetron (ZOFRAN-ODT) 4 MG disintegrating tablet 1 tablet on the tongue and allow to dissolve Orally every 4-6 hours as needed for 30 days 90 tablet 1    albuterol sulfate HFA (PROVENTIL;VENTOLIN;PROAIR) 108 (90 Base) MCG/ACT inhaler Inhale 2 puffs into the lungs every 6 hours as needed for Wheezing       Current Facility-Administered Medications   Medication Dose Route Frequency Provider Last Rate Last Admin    levonorgestrel (MIRENA) IUD 52 mg 1 each  1 each IntraUTERine Once Pam Ohara, DO   1 each at 03/17/23 1200     No Known Allergies    Health Maintenance   Topic Date Due    Pneumococcal 0-64 years Vaccine (1 of 2 - PCV) Never done    Varicella vaccine (1 of 2 - 13+ 2-dose series) Never done    COVID-19 Vaccine (3 - 2023-24 season) 09/01/2024    HPV vaccine (3 - 3-dose SCDM series) 11/03/2024    Depression Monitoring  12/13/2025    Cervical cancer screen  11/07/2028    DTaP/Tdap/Td vaccine (3 - Td or Tdap) 11/16/2032    Hepatitis B vaccine  Completed    Flu vaccine  Completed    HIV screen  Completed    Hepatitis A vaccine  Aged Out    Hib vaccine  Aged Out    Polio vaccine  Aged Out    Meningococcal (ACWY) vaccine  Aged Out    Depression Screen  Discontinued    Hepatitis C screen  Discontinued       Subjective:      Review of Systems   Constitutional:  Negative for activity change, fatigue and fever.   HENT:  Negative for congestion, rhinorrhea and sore throat.    Eyes:  Negative for visual disturbance.   Respiratory:  Negative for chest tightness and shortness of breath.    Cardiovascular:  Negative for chest pain and palpitations.   Gastrointestinal:  Negative for abdominal pain, diarrhea,  better on their own. Home treatment may help you feel better faster. The doctor has checked you carefully, but problems can develop later. If you notice any problems or new symptoms, get medical treatment right away. Follow-up care is a key part of your treatment and safety. Be sure to make and go to all appointments, and call your doctor if you are having problems. It's also a good idea to know your test results and keep a list of the medicines you take. How can you care for yourself at home? · Do not drive if you have taken a prescription pain medicine. · Rest in a quiet, dark room until your headache is gone. Close your eyes and try to relax or go to sleep. Don't watch TV or read. · Put a cold, moist cloth or cold pack on the painful area for 10 to 20 minutes at a time. Put a thin cloth between the cold pack and your skin. · Use a warm, moist towel or a heating pad set on low to relax tight shoulder and neck muscles. · Have someone gently massage your neck and shoulders. · Take pain medicines exactly as directed. ¨ If the doctor gave you a prescription medicine for pain, take it as prescribed. ¨ If you are not taking a prescription pain medicine, ask your doctor if you can take an over-the-counter medicine. · Be careful not to take pain medicine more often than the instructions allow, because you may get worse or more frequent headaches when the medicine wears off. · Do not ignore new symptoms that occur with a headache, such as a fever, weakness or numbness, vision changes, or confusion. These may be signs of a more serious problem. To prevent headaches  · Keep a headache diary so you can figure out what triggers your headaches. Avoiding triggers may help you prevent headaches. Record when each headache began, how long it lasted, and what the pain was like (throbbing, aching, stabbing, or dull).  Write down any other symptoms you had with the headache, such as nausea, flashing lights or dark spots, or sensitivity to bright light or loud noise. Note if the headache occurred near your period. List anything that might have triggered the headache, such as certain foods (chocolate, cheese, wine) or odors, smoke, bright light, stress, or lack of sleep. · Find healthy ways to deal with stress. Headaches are most common during or right after stressful times. Take time to relax before and after you do something that has caused a headache in the past.  · Try to keep your muscles relaxed by keeping good posture. Check your jaw, face, neck, and shoulder muscles for tension, and try relaxing them. When sitting at a desk, change positions often, and stretch for 30 seconds each hour. · Get plenty of sleep and exercise. · Eat regularly and well. Long periods without food can trigger a headache. · Treat yourself to a massage. Some people find that regular massages are very helpful in relieving tension. · Limit caffeine by not drinking too much coffee, tea, or soda. But don't quit caffeine suddenly, because that can also give you headaches. · Reduce eyestrain from computers by blinking frequently and looking away from the computer screen every so often. Make sure you have proper eyewear and that your monitor is set up properly, about an arm's length away. · Seek help if you have depression or anxiety. Your headaches may be linked to these conditions. Treatment can both prevent headaches and help with symptoms of anxiety or depression. When should you call for help? Call 911 anytime you think you may need emergency care. For example, call if:  · You have signs of a stroke. These may include:  ¨ Sudden numbness, paralysis, or weakness in your face, arm, or leg, especially on only one side of your body. ¨ Sudden vision changes. ¨ Sudden trouble speaking. ¨ Sudden confusion or trouble understanding simple statements. ¨ Sudden problems with walking or balance.   ¨ A sudden, severe headache that is different from past headaches. Call your doctor now or seek immediate medical care if:  · You have a new or worse headache. · Your headache gets much worse. Where can you learn more? Go to http://conner-nicolas.info/. Enter M271 in the search box to learn more about \"Headache: Care Instructions. \"  Current as of: October 14, 2016  Content Version: 11.3  © 4387-8308 [employer]. Care instructions adapted under license by [employer] (which disclaims liability or warranty for this information). If you have questions about a medical condition or this instruction, always ask your healthcare professional. [place] [address] any warranty or liability for your use of this information.

## 2025-01-19 LAB — NONINV COLON CA DNA+OCC BLD SCRN STL QL: NEGATIVE

## 2025-02-06 DIAGNOSIS — E66.01 SEVERE OBESITY (BMI 35.0-39.9) WITH COMORBIDITY: Primary | ICD-10-CM

## 2025-02-06 NOTE — PROGRESS NOTES
Patient requests referral to [facility] weight loss clinic.  Has had more difficulty with weight control and is interested in the weight management program.

## 2025-07-25 ENCOUNTER — TELEPHONE (OUTPATIENT)
Age: 50
End: 2025-07-25

## 2025-07-25 RX ORDER — METOPROLOL SUCCINATE 50 MG/1
50 TABLET, EXTENDED RELEASE ORAL DAILY
Qty: 90 TABLET | Refills: 1 | Status: SHIPPED | OUTPATIENT
Start: 2025-07-25

## 2025-07-25 NOTE — TELEPHONE ENCOUNTER
Miky Borrero III, MD  You2 minutes ago (12:23 PM)       Yes, if she is taking the higher dose regularly lets just go ahead and increase it to 50.  Thank you

## 2025-07-25 NOTE — TELEPHONE ENCOUNTER
S/w patient. She states Palpitations/ increased HR worse some days. States Dr Borrero said she could take an extra 1/2 tablet when needed which she has done, but doesn't want to run out. She is asking if she could go up on the dose.    's-130's/ 70's HR in 80's.

## (undated) DEVICE — CONTAINER PREFIL FRMLN 40ML --

## (undated) DEVICE — KENDALL RADIOLUCENT FOAM MONITORING ELECTRODE RECTANGULAR SHAPE: Brand: KENDALL

## (undated) DEVICE — PLASTIC ADHESIVE BANDAGE: Brand: CURITY

## (undated) DEVICE — CANNULA NSL ORAL AD FOR CAPNOFLEX CO2 O2 AIRLFE

## (undated) DEVICE — BLOCK BITE AD 60FR W/ VELC STRP ADDRESSES MOST PT AND

## (undated) DEVICE — CONNECTOR TBNG OD5-7MM O2 END DISP

## (undated) DEVICE — FORCEPS BX L240CM JAW DIA2.8MM L CAP W/ NDL MIC MESH TOOTH

## (undated) DEVICE — TRAY EPIDURAL OB/OR SC ONLY --